# Patient Record
Sex: MALE | Race: WHITE | NOT HISPANIC OR LATINO | Employment: FULL TIME | URBAN - METROPOLITAN AREA
[De-identification: names, ages, dates, MRNs, and addresses within clinical notes are randomized per-mention and may not be internally consistent; named-entity substitution may affect disease eponyms.]

---

## 2018-02-15 ENCOUNTER — OFFICE VISIT (OUTPATIENT)
Dept: FAMILY MEDICINE CLINIC | Facility: CLINIC | Age: 43
End: 2018-02-15

## 2018-02-15 VITALS
RESPIRATION RATE: 16 BRPM | OXYGEN SATURATION: 98 % | SYSTOLIC BLOOD PRESSURE: 128 MMHG | WEIGHT: 167 LBS | HEART RATE: 64 BPM | HEIGHT: 70 IN | DIASTOLIC BLOOD PRESSURE: 72 MMHG | BODY MASS INDEX: 23.91 KG/M2 | TEMPERATURE: 98 F

## 2018-02-15 DIAGNOSIS — Z30.09 VASECTOMY EVALUATION: ICD-10-CM

## 2018-02-15 DIAGNOSIS — R51.9 PRESSURE IN HEAD: Primary | ICD-10-CM

## 2018-02-15 PROCEDURE — 99213 OFFICE O/P EST LOW 20 MIN: CPT | Performed by: FAMILY MEDICINE

## 2018-02-15 NOTE — PROGRESS NOTES
"  Assessment/Plan     Problem List Items Addressed This Visit     None      Visit Diagnoses     Pressure in head    -  Primary    Vasectomy evaluation        Relevant Orders    Ambulatory Referral to Urology           Follow up: Return if symptoms worsen or fail to improve.     DISCUSSION  Head pressure.  Unclear etiology.  Blood pressures controlled now.  Recommend that if this occurs again then he should get his blood pressure checked while tapping.  He agrees.  He has not had it for the last 3 or 4 days.  May be related to the nicotine in the nicotine e-cigarette.    Referral for vascectomy was also made.      MEDICATIONS PRESCRIBED  Requested Prescriptions      No prescriptions requested or ordered in this encounter            -------------------------------------------    Subjective     Chief Complaint   Patient presents with   • Hypertension     running high   • referral to urologist     discuss vasectomy       HPI    Head pressure  Off and on x 3-4 weeks  Trying to quit smoking and has been using the e cig and ? Nicotine too high  + rapid heart rate and chills, comes and goes  Head pressurte started after , in the evenings  Last episode was 3-4 days ago  Has stopped the vaporizer     Had some sinus sx and ear blockage and better now    Vasectomy  Wants to see urology  3 children        Past Medical History,Medications, Allergies, and social history was reviewed.    Review of Systems   Constitutional: Negative.    HENT: Negative.    Respiratory: Negative.    Cardiovascular: Negative.    Gastrointestinal: Negative.    Psychiatric/Behavioral: Negative.        Objective     Vitals:    02/15/18 1633 02/15/18 1655   BP: 118/76 128/72   Pulse: 64    Resp: 16    Temp: 98 °F (36.7 °C)    TempSrc: Temporal Artery     SpO2: 98%    Weight: 75.8 kg (167 lb)    Height: 177.8 cm (70\")         Physical Exam   Constitutional: He is oriented to person, place, and time. Vital signs are normal. He appears well-developed and " well-nourished.   HENT:   Head: Normocephalic and atraumatic.   Right Ear: Hearing, tympanic membrane, external ear and ear canal normal.   Left Ear: Hearing, tympanic membrane, external ear and ear canal normal.   Nose: Nose normal.   Mouth/Throat: Oropharynx is clear and moist.   Eyes: Conjunctivae, EOM and lids are normal. Pupils are equal, round, and reactive to light.   Neck: Normal range of motion. Neck supple. No thyromegaly present.   Cardiovascular: Normal rate, regular rhythm and normal heart sounds.  Exam reveals no friction rub.    No murmur heard.  Pulmonary/Chest: Effort normal and breath sounds normal. No respiratory distress. He has no wheezes. He has no rales.   Abdominal: Normal appearance.   Musculoskeletal: He exhibits no edema.   Neurological: He is alert and oriented to person, place, and time. He has normal strength.   Skin: Skin is warm and dry.   Psychiatric: He has a normal mood and affect. His speech is normal and behavior is normal. Cognition and memory are normal.   Nursing note and vitals reviewed.              Ronnie Miller MD

## 2018-04-05 ENCOUNTER — OFFICE VISIT (OUTPATIENT)
Dept: FAMILY MEDICINE CLINIC | Facility: CLINIC | Age: 43
End: 2018-04-05

## 2018-04-05 VITALS
HEART RATE: 124 BPM | HEIGHT: 70 IN | OXYGEN SATURATION: 98 % | DIASTOLIC BLOOD PRESSURE: 62 MMHG | RESPIRATION RATE: 18 BRPM | WEIGHT: 165.5 LBS | SYSTOLIC BLOOD PRESSURE: 122 MMHG | TEMPERATURE: 98.2 F | BODY MASS INDEX: 23.69 KG/M2

## 2018-04-05 DIAGNOSIS — H73.92 ABNORMAL TYMPANIC MEMBRANE OF LEFT EAR: ICD-10-CM

## 2018-04-05 DIAGNOSIS — R00.0 TACHYCARDIA: Primary | ICD-10-CM

## 2018-04-05 DIAGNOSIS — R55 NEAR SYNCOPE: ICD-10-CM

## 2018-04-05 PROCEDURE — 93000 ELECTROCARDIOGRAM COMPLETE: CPT | Performed by: FAMILY MEDICINE

## 2018-04-05 PROCEDURE — 99214 OFFICE O/P EST MOD 30 MIN: CPT | Performed by: FAMILY MEDICINE

## 2018-04-05 NOTE — PROGRESS NOTES
Assessment/Plan       Problems Addressed this Visit     None      Visit Diagnoses     Tachycardia    -  Primary    Relevant Orders    CBC & Differential    Comprehensive Metabolic Panel    TSH    T3    T4, Free    Holter monitor - 48 hour    Adult Transthoracic Echo Complete W/ Cont if Necessary Per Protocol    Near syncope        Relevant Orders    CBC & Differential    Comprehensive Metabolic Panel    Holter monitor - 48 hour    Adult Transthoracic Echo Complete W/ Cont if Necessary Per Protocol    Abnormal tympanic membrane of left ear        Relevant Orders    Ambulatory Referral to ENT (Otolaryngology)            Follow up: Return for follow up depends on review of labs and testing.     DISCUSSION  Tachycardia with near syncope.  Check labs and testing as noted.  Seek urgent medical attention if symptoms worsen or becomes associated with syncope.  He agrees.  At this time, he is hemodynamically stable and asymptomatic.  Recommend avoid anything that can increase heart rate including caffeine.    Also, due to abnormal tympanic membrane of the left ear, recommend refer to ENT for evaluation.  May have chronic perforation but need to evaluate for possible cholesteatoma    MEDICATIONS PRESCRIBED  Requested Prescriptions      No prescriptions requested or ordered in this encounter          -------------------------------------------    Subjective     Chief Complaint   Patient presents with   • Dizziness     feeling like he might pass out, heart pounding, pacing helps.          Dizziness   This is a recurrent (see below) problem. The current episode started more than 1 month ago. The problem occurs intermittently. The problem has been gradually worsening. Associated symptoms include headaches (pressure at times). Pertinent negatives include no chest pain. Nothing aggravates the symptoms. He has tried nothing (pacing helps) for the symptoms. The treatment provided no relief.       Increased heart at times  Feels  "like going to pass out  Quit coffee yesterday  Has been coming and going for several weeks  Feels ok right now  Worse this am   No chest pain   Gas feeling   Can feel heart racing , hard to tell if skips    Not passed out from it    Walking helps the symptoms    Denies drug use.      Past Medical History,Medications, Allergies, and social history was reviewed.      Review of Systems   Constitutional: Negative.    Respiratory: Negative.    Cardiovascular: Positive for palpitations (increased HR). Negative for chest pain.   Gastrointestinal: Negative.    Neurological: Positive for dizziness and headaches (pressure at times). Negative for syncope (near + ).   Psychiatric/Behavioral: Negative.        Objective     Vitals:    04/05/18 1239   BP: 122/62   Pulse: (!) 124   Resp: 18   Temp: 98.2 °F (36.8 °C)   SpO2: 98%   Weight: 75.1 kg (165 lb 8 oz)   Height: 177.8 cm (70\")          Physical Exam   Constitutional: He is oriented to person, place, and time. Vital signs are normal. He appears well-developed and well-nourished.   HENT:   Head: Normocephalic and atraumatic.   Right Ear: Hearing, tympanic membrane, external ear and ear canal normal.   Left Ear: Hearing, external ear and ear canal normal. Tympanic membrane is perforated (vs blister, not normal appearance).   Nose: Nose normal.   Mouth/Throat: Oropharynx is clear and moist.   Eyes: Conjunctivae, EOM and lids are normal. Pupils are equal, round, and reactive to light.   Neck: Normal range of motion. Neck supple. No thyromegaly present.   Cardiovascular: Regular rhythm and normal heart sounds.  Tachycardia present.  Exam reveals no friction rub.    No murmur heard.  Pulmonary/Chest: Effort normal and breath sounds normal. No respiratory distress. He has no wheezes. He has no rales.   Abdominal: Normal appearance.   Musculoskeletal: He exhibits no edema.   Neurological: He is alert and oriented to person, place, and time. He has normal strength.   Skin: Skin is " warm and dry.   Psychiatric: He has a normal mood and affect. His speech is normal and behavior is normal. Cognition and memory are normal.   Nursing note and vitals reviewed.              ECG 12 Lead  Date/Time: 4/5/2018 12:54 PM  Performed by: ELLEN MILLER  Authorized by: ELLEN MILLER   Previous ECG: no previous ECG available  Rhythm: sinus tachycardia  Rate: tachycardic  BPM: 102  Conduction: conduction normal  ST Segments: ST segments normal  T Waves: T waves normal  QRS axis: normal  Other findings: PRWP  Clinical impression: non-specific ECG             Ellen Miller MD

## 2018-04-06 LAB
ALBUMIN SERPL-MCNC: 4.9 G/DL (ref 3.2–4.8)
ALBUMIN/GLOB SERPL: 1.6 G/DL (ref 1.5–2.5)
ALP SERPL-CCNC: 76 U/L (ref 25–100)
ALT SERPL-CCNC: 29 U/L (ref 7–40)
AST SERPL-CCNC: 21 U/L (ref 0–33)
BASOPHILS # BLD AUTO: 0.04 10*3/MM3 (ref 0–0.2)
BASOPHILS NFR BLD AUTO: 0.6 % (ref 0–1)
BILIRUB SERPL-MCNC: 0.4 MG/DL (ref 0.3–1.2)
BUN SERPL-MCNC: 14 MG/DL (ref 9–23)
BUN/CREAT SERPL: 12.7 (ref 7–25)
CALCIUM SERPL-MCNC: 9.8 MG/DL (ref 8.7–10.4)
CHLORIDE SERPL-SCNC: 106 MMOL/L (ref 99–109)
CO2 SERPL-SCNC: 26 MMOL/L (ref 20–31)
CREAT SERPL-MCNC: 1.1 MG/DL (ref 0.6–1.3)
EOSINOPHIL # BLD AUTO: 0.09 10*3/MM3 (ref 0–0.3)
EOSINOPHIL NFR BLD AUTO: 1.4 % (ref 0–3)
ERYTHROCYTE [DISTWIDTH] IN BLOOD BY AUTOMATED COUNT: 13.7 % (ref 11.3–14.5)
GFR SERPLBLD CREATININE-BSD FMLA CKD-EPI: 74 ML/MIN/1.73
GFR SERPLBLD CREATININE-BSD FMLA CKD-EPI: 89 ML/MIN/1.73
GLOBULIN SER CALC-MCNC: 3.1 GM/DL
GLUCOSE SERPL-MCNC: 111 MG/DL (ref 70–100)
HCT VFR BLD AUTO: 44.2 % (ref 38.9–50.9)
HGB BLD-MCNC: 14.9 G/DL (ref 13.1–17.5)
IMM GRANULOCYTES # BLD: 0.01 10*3/MM3 (ref 0–0.03)
IMM GRANULOCYTES NFR BLD: 0.2 % (ref 0–0.6)
LYMPHOCYTES # BLD AUTO: 1.82 10*3/MM3 (ref 0.6–4.8)
LYMPHOCYTES NFR BLD AUTO: 27.4 % (ref 24–44)
MCH RBC QN AUTO: 31.8 PG (ref 27–31)
MCHC RBC AUTO-ENTMCNC: 33.7 G/DL (ref 32–36)
MCV RBC AUTO: 94.2 FL (ref 80–99)
MONOCYTES # BLD AUTO: 0.55 10*3/MM3 (ref 0–1)
MONOCYTES NFR BLD AUTO: 8.3 % (ref 0–12)
NEUTROPHILS # BLD AUTO: 4.14 10*3/MM3 (ref 1.5–8.3)
NEUTROPHILS NFR BLD AUTO: 62.1 % (ref 41–71)
PLATELET # BLD AUTO: 302 10*3/MM3 (ref 150–450)
POTASSIUM SERPL-SCNC: 4.1 MMOL/L (ref 3.5–5.5)
PROT SERPL-MCNC: 8 G/DL (ref 5.7–8.2)
RBC # BLD AUTO: 4.69 10*6/MM3 (ref 4.2–5.76)
SODIUM SERPL-SCNC: 142 MMOL/L (ref 132–146)
T3 SERPL-MCNC: 111 NG/DL (ref 71–180)
T4 FREE SERPL-MCNC: 1.21 NG/DL (ref 0.89–1.76)
TSH SERPL DL<=0.005 MIU/L-ACNC: 1.43 MIU/ML (ref 0.35–5.35)
WBC # BLD AUTO: 6.65 10*3/MM3 (ref 3.5–10.8)

## 2018-12-13 ENCOUNTER — TELEPHONE (OUTPATIENT)
Dept: FAMILY MEDICINE CLINIC | Facility: CLINIC | Age: 43
End: 2018-12-13

## 2018-12-13 NOTE — TELEPHONE ENCOUNTER
Please call.  Okay to get hepatitis A vaccine.  will place order once he comes in and gets that but please let him know that it does go fast .

## 2018-12-13 NOTE — TELEPHONE ENCOUNTER
----- Message from Luna Alcantara sent at 12/13/2018  1:24 PM EST -----  Contact: DR MCKEE   PATIENT IS REQUESTING AN ORDER BE PUT IN FOR A HEP A VACCINE

## 2018-12-18 DIAGNOSIS — Z23 NEED FOR HEPATITIS A VACCINATION: Primary | ICD-10-CM

## 2018-12-18 PROCEDURE — 90471 IMMUNIZATION ADMIN: CPT | Performed by: FAMILY MEDICINE

## 2018-12-18 PROCEDURE — 90632 HEPA VACCINE ADULT IM: CPT | Performed by: FAMILY MEDICINE

## 2019-01-11 ENCOUNTER — OFFICE VISIT (OUTPATIENT)
Dept: FAMILY MEDICINE CLINIC | Facility: CLINIC | Age: 44
End: 2019-01-11

## 2019-01-11 VITALS
HEIGHT: 70 IN | DIASTOLIC BLOOD PRESSURE: 82 MMHG | BODY MASS INDEX: 25.2 KG/M2 | TEMPERATURE: 98 F | WEIGHT: 176 LBS | HEART RATE: 76 BPM | SYSTOLIC BLOOD PRESSURE: 132 MMHG | RESPIRATION RATE: 18 BRPM

## 2019-01-11 DIAGNOSIS — K21.9 GASTROESOPHAGEAL REFLUX DISEASE WITHOUT ESOPHAGITIS: Primary | ICD-10-CM

## 2019-01-11 PROCEDURE — 99214 OFFICE O/P EST MOD 30 MIN: CPT | Performed by: FAMILY MEDICINE

## 2019-01-11 RX ORDER — OMEPRAZOLE 40 MG/1
40 CAPSULE, DELAYED RELEASE ORAL DAILY
Qty: 30 CAPSULE | Refills: 2 | Status: ON HOLD | OUTPATIENT
Start: 2019-01-11 | End: 2019-12-13

## 2019-01-11 NOTE — PROGRESS NOTES
Assessment/Plan       Problems Addressed this Visit     None      Visit Diagnoses     Gastroesophageal reflux disease without esophagitis    -  Primary    Relevant Medications    omeprazole (PRILOSEC) 40 MG capsule            Follow up: Return if symptoms worsen or fail to improve.     DISCUSSION  GI symptoms consistent with persistent gastroesophageal reflux disease.  Also having systemic symptoms including muscle aches and some disorientation.  Unclear if related but will try omeprazole 40 mg daily at least 30 minutes before meal.  Take for at least 8 weeks and then may try off unless it is not getting better he will call us within a week or so.  Call sooner if worsening.  He denies any blood in stool and no dark tarry stools.      MEDICATIONS PRESCRIBED  Requested Prescriptions     Signed Prescriptions Disp Refills   • omeprazole (PRILOSEC) 40 MG capsule 30 capsule 2     Sig: Take 1 capsule by mouth Daily.        -------------------------------------------    Subjective     Chief Complaint   Patient presents with   • stomach problems         Heartburn   He complains of belching and heartburn. He reports no abdominal pain, no chest pain or no nausea. muscle soreness, diarrhea, feels disoriented at times, burning sensation in chest. gassy as welll, burning in chest. This is a recurrent problem. The current episode started more than 1 month ago. The problem occurs frequently. The problem has been waxing and waning. The symptoms are aggravated by certain foods (at times, food makes it worse, onions and ketchup. This amhad 2 blueberry muffins, drinks 24 oz coffee and 32 oz sweet tea. no increase with alaying down). Associated symptoms include muscle weakness. Pertinent negatives include no weight loss. He has tried a histamine-2 antagonist (tried Ranitidine 150 mg once per day and sl help ) for the symptoms. The treatment provided mild relief.               Social History     Tobacco Use   Smoking Status Current  "Every Day Smoker   • Packs/day: 0.25   • Types: Cigarettes   Smokeless Tobacco Never Used        Past Medical History,Medications, Allergies, and social history was reviewed.      Review of Systems   Constitutional: Negative.  Negative for fever, unexpected weight gain and unexpected weight loss.        Occ cold sweats   HENT: Negative.    Respiratory: Negative.    Cardiovascular: Negative.  Negative for chest pain.   Gastrointestinal: Positive for diarrhea and indigestion. Negative for abdominal pain, nausea and vomiting.   Musculoskeletal: Positive for myalgias and muscle weakness.   Neurological:        Feels disoriented   Psychiatric/Behavioral: Negative.         Sx do not wake him up.       Objective     Vitals:    01/11/19 1612   BP: 132/82   Pulse: 76   Resp: 18   Temp: 98 °F (36.7 °C)   Weight: 79.8 kg (176 lb)   Height: 177.8 cm (70\")          Physical Exam   Constitutional: Vital signs are normal. He appears well-developed and well-nourished.   HENT:   Head: Normocephalic and atraumatic.   Right Ear: Hearing, tympanic membrane, external ear and ear canal normal.   Left Ear: Hearing, tympanic membrane, external ear and ear canal normal.   Mouth/Throat: Oropharynx is clear and moist.   Eyes: Conjunctivae, EOM and lids are normal. Pupils are equal, round, and reactive to light.   Neck: Normal range of motion. Neck supple. No thyromegaly present.   Cardiovascular: Normal rate, regular rhythm and normal heart sounds. Exam reveals no friction rub.   No murmur heard.  Pulmonary/Chest: Effort normal and breath sounds normal. No respiratory distress. He has no wheezes. He has no rales.   Abdominal: Soft. Normal appearance and bowel sounds are normal. He exhibits no distension and no mass. There is no tenderness. There is no rebound and no guarding.   Musculoskeletal: He exhibits no edema.   Neurological: He is alert. He has normal strength.   Skin: Skin is warm and dry.   Psychiatric: He has a normal mood and " affect. His speech is normal. Cognition and memory are normal.   Nursing note and vitals reviewed.                Ronnie Miller MD

## 2019-12-13 ENCOUNTER — HOSPITAL ENCOUNTER (OUTPATIENT)
Facility: HOSPITAL | Age: 44
Setting detail: HOSPITAL OUTPATIENT SURGERY
End: 2019-12-13
Admitting: INTERNAL MEDICINE

## 2019-12-13 ENCOUNTER — HOSPITAL ENCOUNTER (INPATIENT)
Facility: HOSPITAL | Age: 44
LOS: 4 days | Discharge: HOME | End: 2019-12-17
Attending: INTERNAL MEDICINE | Admitting: INTERNAL MEDICINE

## 2019-12-13 ENCOUNTER — APPOINTMENT (OUTPATIENT)
Dept: GENERAL RADIOLOGY | Facility: HOSPITAL | Age: 44
End: 2019-12-13

## 2019-12-13 ENCOUNTER — OFFICE VISIT (OUTPATIENT)
Dept: FAMILY MEDICINE CLINIC | Facility: CLINIC | Age: 44
End: 2019-12-13

## 2019-12-13 VITALS
HEART RATE: 107 BPM | OXYGEN SATURATION: 99 % | HEIGHT: 70 IN | RESPIRATION RATE: 18 BRPM | SYSTOLIC BLOOD PRESSURE: 110 MMHG | DIASTOLIC BLOOD PRESSURE: 66 MMHG | TEMPERATURE: 98.6 F | BODY MASS INDEX: 25.31 KG/M2 | WEIGHT: 176.8 LBS

## 2019-12-13 VITALS
SYSTOLIC BLOOD PRESSURE: 119 MMHG | RESPIRATION RATE: 18 BRPM | HEART RATE: 111 BPM | OXYGEN SATURATION: 95 % | DIASTOLIC BLOOD PRESSURE: 85 MMHG

## 2019-12-13 DIAGNOSIS — R50.9 FEVER, UNSPECIFIED FEVER CAUSE: ICD-10-CM

## 2019-12-13 DIAGNOSIS — R21 RASH: ICD-10-CM

## 2019-12-13 DIAGNOSIS — J02.0 ACUTE STREPTOCOCCAL PHARYNGITIS: Primary | ICD-10-CM

## 2019-12-13 DIAGNOSIS — R05.9 COUGH: ICD-10-CM

## 2019-12-13 PROBLEM — J18.9 COMMUNITY ACQUIRED PNEUMONIA OF RIGHT LOWER LOBE OF LUNG: Status: ACTIVE | Noted: 2019-12-13

## 2019-12-13 PROBLEM — J06.9 UPPER RESPIRATORY INFECTION: Status: ACTIVE | Noted: 2019-12-13

## 2019-12-13 PROBLEM — K04.7 DENTAL INFECTION: Status: ACTIVE | Noted: 2019-12-13

## 2019-12-13 PROBLEM — Z72.0 TOBACCO ABUSE: Status: ACTIVE | Noted: 2019-12-13

## 2019-12-13 PROBLEM — I31.9 PERICARDITIS: Status: ACTIVE | Noted: 2019-12-13

## 2019-12-13 LAB
EXPIRATION DATE: NORMAL
EXPIRATION DATE: NORMAL
FLUAV AG NPH QL: NEGATIVE
FLUBV AG NPH QL: NEGATIVE
HETEROPH AB SER QL LA: NEGATIVE
INTERNAL CONTROL: NORMAL
INTERNAL CONTROL: NORMAL
Lab: NORMAL
Lab: NORMAL

## 2019-12-13 PROCEDURE — B2151ZZ FLUOROSCOPY OF LEFT HEART USING LOW OSMOLAR CONTRAST: ICD-10-PCS | Performed by: INTERNAL MEDICINE

## 2019-12-13 PROCEDURE — 86308 HETEROPHILE ANTIBODY SCREEN: CPT | Performed by: FAMILY MEDICINE

## 2019-12-13 PROCEDURE — 99233 SBSQ HOSP IP/OBS HIGH 50: CPT | Performed by: INTERNAL MEDICINE

## 2019-12-13 PROCEDURE — 99152 MOD SED SAME PHYS/QHP 5/>YRS: CPT | Performed by: INTERNAL MEDICINE

## 2019-12-13 PROCEDURE — C1894 INTRO/SHEATH, NON-LASER: HCPCS | Performed by: INTERNAL MEDICINE

## 2019-12-13 PROCEDURE — 93458 L HRT ARTERY/VENTRICLE ANGIO: CPT | Performed by: INTERNAL MEDICINE

## 2019-12-13 PROCEDURE — 99213 OFFICE O/P EST LOW 20 MIN: CPT | Performed by: FAMILY MEDICINE

## 2019-12-13 PROCEDURE — 87804 INFLUENZA ASSAY W/OPTIC: CPT | Performed by: FAMILY MEDICINE

## 2019-12-13 PROCEDURE — 4A023N7 MEASUREMENT OF CARDIAC SAMPLING AND PRESSURE, LEFT HEART, PERCUTANEOUS APPROACH: ICD-10-PCS | Performed by: INTERNAL MEDICINE

## 2019-12-13 PROCEDURE — C1769 GUIDE WIRE: HCPCS | Performed by: INTERNAL MEDICINE

## 2019-12-13 PROCEDURE — 0 IOPAMIDOL PER 1 ML: Performed by: INTERNAL MEDICINE

## 2019-12-13 PROCEDURE — 84484 ASSAY OF TROPONIN QUANT: CPT | Performed by: INTERNAL MEDICINE

## 2019-12-13 PROCEDURE — 93005 ELECTROCARDIOGRAM TRACING: CPT | Performed by: INTERNAL MEDICINE

## 2019-12-13 PROCEDURE — B2111ZZ FLUOROSCOPY OF MULTIPLE CORONARY ARTERIES USING LOW OSMOLAR CONTRAST: ICD-10-PCS | Performed by: INTERNAL MEDICINE

## 2019-12-13 PROCEDURE — 25010000002 MIDAZOLAM PER 1 MG: Performed by: INTERNAL MEDICINE

## 2019-12-13 PROCEDURE — C1760 CLOSURE DEV, VASC: HCPCS | Performed by: INTERNAL MEDICINE

## 2019-12-13 PROCEDURE — 25010000002 FENTANYL CITRATE (PF) 100 MCG/2ML SOLUTION: Performed by: INTERNAL MEDICINE

## 2019-12-13 PROCEDURE — 71045 X-RAY EXAM CHEST 1 VIEW: CPT

## 2019-12-13 RX ORDER — IBUPROFEN 600 MG/1
600 TABLET ORAL
Status: DISCONTINUED | OUTPATIENT
Start: 2019-12-14 | End: 2019-12-17 | Stop reason: HOSPADM

## 2019-12-13 RX ORDER — NALOXONE HCL 0.4 MG/ML
0.4 VIAL (ML) INJECTION
Status: CANCELLED | OUTPATIENT
Start: 2019-12-13

## 2019-12-13 RX ORDER — MIDAZOLAM HYDROCHLORIDE 1 MG/ML
INJECTION INTRAMUSCULAR; INTRAVENOUS AS NEEDED
Status: DISCONTINUED | OUTPATIENT
Start: 2019-12-13 | End: 2019-12-13 | Stop reason: HOSPADM

## 2019-12-13 RX ORDER — PANTOPRAZOLE SODIUM 40 MG/1
40 TABLET, DELAYED RELEASE ORAL
Status: DISCONTINUED | OUTPATIENT
Start: 2019-12-14 | End: 2019-12-17 | Stop reason: HOSPADM

## 2019-12-13 RX ORDER — MORPHINE SULFATE 2 MG/ML
1 INJECTION, SOLUTION INTRAMUSCULAR; INTRAVENOUS EVERY 4 HOURS PRN
Status: CANCELLED | OUTPATIENT
Start: 2019-12-13 | End: 2019-12-23

## 2019-12-13 RX ORDER — PREDNISONE 20 MG/1
20 TABLET ORAL 2 TIMES DAILY
Qty: 10 TABLET | Refills: 0 | Status: ON HOLD | OUTPATIENT
Start: 2019-12-13 | End: 2019-12-13

## 2019-12-13 RX ORDER — HYDROCODONE BITARTRATE AND ACETAMINOPHEN 5; 325 MG/1; MG/1
1 TABLET ORAL EVERY 4 HOURS PRN
Status: CANCELLED | OUTPATIENT
Start: 2019-12-13 | End: 2019-12-23

## 2019-12-13 RX ORDER — AMOXICILLIN 500 MG/1
CAPSULE ORAL
Refills: 0 | COMMUNITY
Start: 2019-12-03 | End: 2019-12-13

## 2019-12-13 RX ORDER — LIDOCAINE HYDROCHLORIDE 10 MG/ML
INJECTION, SOLUTION EPIDURAL; INFILTRATION; INTRACAUDAL; PERINEURAL AS NEEDED
Status: DISCONTINUED | OUTPATIENT
Start: 2019-12-13 | End: 2019-12-13 | Stop reason: HOSPADM

## 2019-12-13 RX ORDER — GUAIFENESIN/DEXTROMETHORPHAN 100-10MG/5
5 SYRUP ORAL EVERY 4 HOURS PRN
Status: DISCONTINUED | OUTPATIENT
Start: 2019-12-13 | End: 2019-12-14

## 2019-12-13 RX ORDER — BROMPHENIRAMINE MALEATE, PSEUDOEPHEDRINE HYDROCHLORIDE, AND DEXTROMETHORPHAN HYDROBROMIDE 2; 30; 10 MG/5ML; MG/5ML; MG/5ML
SYRUP ORAL
COMMUNITY
Start: 2019-12-11 | End: 2019-12-13 | Stop reason: ALTCHOICE

## 2019-12-13 RX ORDER — PANTOPRAZOLE SODIUM 40 MG/1
40 TABLET, DELAYED RELEASE ORAL EVERY MORNING
Status: CANCELLED | OUTPATIENT
Start: 2019-12-14

## 2019-12-13 RX ORDER — SODIUM CHLORIDE 9 MG/ML
INJECTION, SOLUTION INTRAVENOUS CONTINUOUS PRN
Status: COMPLETED | OUTPATIENT
Start: 2019-12-13 | End: 2019-12-13

## 2019-12-13 RX ORDER — CEPHALEXIN 250 MG/1
500 CAPSULE ORAL EVERY 12 HOURS SCHEDULED
Status: DISCONTINUED | OUTPATIENT
Start: 2019-12-13 | End: 2019-12-13

## 2019-12-13 RX ORDER — ACETAMINOPHEN 325 MG/1
650 TABLET ORAL EVERY 4 HOURS PRN
Status: CANCELLED | OUTPATIENT
Start: 2019-12-13

## 2019-12-13 RX ORDER — FENTANYL CITRATE 50 UG/ML
INJECTION, SOLUTION INTRAMUSCULAR; INTRAVENOUS AS NEEDED
Status: DISCONTINUED | OUTPATIENT
Start: 2019-12-13 | End: 2019-12-13 | Stop reason: HOSPADM

## 2019-12-13 RX ORDER — TEMAZEPAM 7.5 MG/1
7.5 CAPSULE ORAL NIGHTLY PRN
Status: CANCELLED | OUTPATIENT
Start: 2019-12-13 | End: 2019-12-23

## 2019-12-13 RX ORDER — CEPHALEXIN 500 MG/1
500 CAPSULE ORAL 2 TIMES DAILY
Qty: 20 CAPSULE | Refills: 0 | Status: ON HOLD | OUTPATIENT
Start: 2019-12-13 | End: 2019-12-13

## 2019-12-13 RX ORDER — ALPRAZOLAM 0.25 MG/1
0.25 TABLET ORAL 3 TIMES DAILY PRN
Status: CANCELLED | OUTPATIENT
Start: 2019-12-13 | End: 2019-12-23

## 2019-12-13 RX ORDER — DEXTROMETHORPHAN HYDROBROMIDE AND PROMETHAZINE HYDROCHLORIDE 15; 6.25 MG/5ML; MG/5ML
5 SYRUP ORAL 4 TIMES DAILY PRN
Qty: 180 ML | Refills: 0 | Status: ON HOLD | OUTPATIENT
Start: 2019-12-13 | End: 2019-12-13

## 2019-12-13 NOTE — PATIENT INSTRUCTIONS
Go to the nearest ER or return to clinic if symptoms worsen, fever/chill develop    Strep Throat    Strep throat is an infection of the throat. It is caused by germs. Strep throat spreads from person to person because of coughing, sneezing, or close contact.  Follow these instructions at home:  Medicines  · Take over-the-counter and prescription medicines only as told by your doctor.  · Take your antibiotic medicine as told by your doctor. Do not stop taking the medicine even if you feel better.  · Have family members who also have a sore throat or fever go to a doctor.  Eating and drinking  · Do not share food, drinking cups, or personal items.  · Try eating soft foods until your sore throat feels better.  · Drink enough fluid to keep your pee (urine) clear or pale yellow.  General instructions  · Rinse your mouth (gargle) with a salt-water mixture 3-4 times per day or as needed. To make a salt-water mixture, stir ½-1 tsp of salt into 1 cup of warm water.  · Make sure that all people in your house wash their hands well.  · Rest.  · Stay home from school or work until you have been taking antibiotics for 24 hours.  · Keep all follow-up visits as told by your doctor. This is important.  Contact a doctor if:  · Your neck keeps getting bigger.  · You get a rash, cough, or earache.  · You cough up thick liquid that is green, yellow-brown, or bloody.  · You have pain that does not get better with medicine.  · Your problems get worse instead of getting better.  · You have a fever.  Get help right away if:  · You throw up (vomit).  · You get a very bad headache.  · You neck hurts or it feels stiff.  · You have chest pain or you are short of breath.  · You have drooling, very bad throat pain, or changes in your voice.  · Your neck is swollen or the skin gets red and tender.  · Your mouth is dry or you are peeing less than normal.  · You keep feeling more tired or it is hard to wake up.  · Your joints are red or they  hurt.  This information is not intended to replace advice given to you by your health care provider. Make sure you discuss any questions you have with your health care provider.  Document Released: 06/05/2009 Document Revised: 08/16/2017 Document Reviewed: 04/11/2016  [employer] Patient Education © 2019 [employer]

## 2019-12-13 NOTE — PROGRESS NOTES
Dyan Schneider is a 44 y.o. male.   Chief Complaint   Patient presents with   • Fever     since 12/8/19 reports highest was 103.7    • Generalized Body Aches     [facility] 12/11-neg flu/neg strep   • Cough     wet, productive cough clear to yellow tinted sputum   • Rash     started 12/11 all over entire body     History of Present Illness   Symptoms started 12/8/19 with fever, which is now intermittent.   Tmax 103.7   Generalized body aches and joint aches. Even hard for him to stand up and get out of bed,  strength is decreased.   Went to [facility] 12/11/19, tested negative for influenza and strep. [facility] treated him with cough syrup, tylenol, ibuprofen.  Rash started 2 nights ago on his back. Next day, on abdomen, and then today all the way to his feet. Rash gradually improving, but still present.   Prior to illness, he was taking Amoxicillin for dental reasons. He stopped this 2 days ago.   Currently, has sore throat, productive cough, body aches, and fever.     The following portions of the patient's history were reviewed and updated as appropriate: allergies, current medications, past family history, past medical history, past social history, past surgical history and problem list.    Review of Systems   Constitutional: Positive for fatigue and fever.   HENT: Positive for congestion, ear pain and sore throat. Negative for ear discharge, rhinorrhea and sinus pressure.    Respiratory: Positive for cough. Negative for shortness of breath and wheezing.    Cardiovascular: Negative for chest pain.   Musculoskeletal: Positive for myalgias.   Skin: Positive for rash.   Neurological: Positive for headache.       Objective   Physical Exam   Constitutional: He appears well-developed and well-nourished.   Appears ill   HENT:   Head: Normocephalic.   Right Ear: Hearing, tympanic membrane, external ear and ear canal normal. Tympanic membrane is not injected and not erythematous.   Left Ear: External ear  normal. Tympanic membrane is not injected and not erythematous. A middle ear effusion is present.   Nose: Congestion present.   Mouth/Throat: Uvula is midline. Posterior oropharyngeal erythema present. Tonsils are 2+ on the right. Tonsils are 2+ on the left. Tonsillar exudate.   Eyes: Conjunctivae are normal.   Cardiovascular: Normal rate, regular rhythm and normal heart sounds.   Pulmonary/Chest: Effort normal and breath sounds normal. He has no wheezes. He has no rhonchi.   Lymphadenopathy:     He has no cervical adenopathy.   Neurological: He is alert.   Skin: Skin is warm and dry. Rash noted. Rash is macular (blanchable, mild erythema, present on back, trunk, arms, and legs.).   Psychiatric: His behavior is normal.   Nursing note and vitals reviewed.        Assessment/Plan   Boom was seen today for fever, generalized body aches, cough and rash.    Diagnoses and all orders for this visit:    Acute streptococcal pharyngitis  -     predniSONE (DELTASONE) 20 MG tablet; Take 1 tablet by mouth 2 (Two) Times a Day for 5 days.  -     cephalexin (KEFLEX) 500 MG capsule; Take 1 capsule by mouth 2 (Two) Times a Day for 10 days.    Rash    Fever, unspecified fever cause  -     POCT Influenza A/B  -     POCT Infectious mononucleosis antibody  -     predniSONE (DELTASONE) 20 MG tablet; Take 1 tablet by mouth 2 (Two) Times a Day for 5 days.  -     cephalexin (KEFLEX) 500 MG capsule; Take 1 capsule by mouth 2 (Two) Times a Day for 10 days.    Cough  -     promethazine-dextromethorphan (PROMETHAZINE-DM) 6.25-15 MG/5ML syrup; Take 5 mL by mouth 4 (Four) Times a Day As Needed for Cough.      Monospot and influenza A/B are negative.  Based on physical exam, will cover for streptococcal pharyngitis.  If symptoms worsen, he has been advised to go to ER for immediate evaluation.

## 2019-12-14 ENCOUNTER — APPOINTMENT (OUTPATIENT)
Dept: CARDIOLOGY | Facility: HOSPITAL | Age: 44
End: 2019-12-14

## 2019-12-14 PROBLEM — R09.1 PLEURISY: Status: ACTIVE | Noted: 2019-12-14

## 2019-12-14 LAB
ANION GAP SERPL CALCULATED.3IONS-SCNC: 16 MMOL/L (ref 5–15)
B PARAPERT DNA SPEC QL NAA+PROBE: NOT DETECTED
B PERT DNA SPEC QL NAA+PROBE: NOT DETECTED
BASOPHILS # BLD AUTO: 0.04 10*3/MM3 (ref 0–0.2)
BASOPHILS NFR BLD AUTO: 0.3 % (ref 0–1.5)
BH CV ECHO MEAS - AO ROOT AREA (BSA CORRECTED): 1.4
BH CV ECHO MEAS - AO ROOT AREA: 6.1 CM^2
BH CV ECHO MEAS - AO ROOT DIAM: 2.8 CM
BH CV ECHO MEAS - BSA(HAYCOCK): 2 M^2
BH CV ECHO MEAS - BSA: 2 M^2
BH CV ECHO MEAS - BZI_BMI: 25.3 KILOGRAMS/M^2
BH CV ECHO MEAS - BZI_METRIC_HEIGHT: 177.8 CM
BH CV ECHO MEAS - BZI_METRIC_WEIGHT: 79.8 KG
BH CV ECHO MEAS - EDV(CUBED): 85.6 ML
BH CV ECHO MEAS - EDV(TEICH): 88.1 ML
BH CV ECHO MEAS - EF(CUBED): 63.6 %
BH CV ECHO MEAS - EF(TEICH): 55.3 %
BH CV ECHO MEAS - ESV(CUBED): 31.2 ML
BH CV ECHO MEAS - ESV(TEICH): 39.4 ML
BH CV ECHO MEAS - FS: 28.6 %
BH CV ECHO MEAS - IVS/LVPW: 0.97
BH CV ECHO MEAS - IVSD: 0.86 CM
BH CV ECHO MEAS - LA DIMENSION: 2.8 CM
BH CV ECHO MEAS - LA/AO: 1
BH CV ECHO MEAS - LAD MAJOR: 4.7 CM
BH CV ECHO MEAS - LAT PEAK E' VEL: 14.1 CM/SEC
BH CV ECHO MEAS - LATERAL E/E' RATIO: 6
BH CV ECHO MEAS - LV MASS(C)D: 123.4 GRAMS
BH CV ECHO MEAS - LV MASS(C)DI: 62.4 GRAMS/M^2
BH CV ECHO MEAS - LVIDD: 4.4 CM
BH CV ECHO MEAS - LVIDS: 3.1 CM
BH CV ECHO MEAS - LVPWD: 0.89 CM
BH CV ECHO MEAS - MED PEAK E' VEL: 10.2 CM/SEC
BH CV ECHO MEAS - MEDIAL E/E' RATIO: 8.3
BH CV ECHO MEAS - MV A MAX VEL: 75.5 CM/SEC
BH CV ECHO MEAS - MV DEC SLOPE: 541.9 CM/SEC^2
BH CV ECHO MEAS - MV DEC TIME: 0.16 SEC
BH CV ECHO MEAS - MV E MAX VEL: 85.9 CM/SEC
BH CV ECHO MEAS - MV E/A: 1.1
BH CV ECHO MEAS - MV P1/2T MAX VEL: 105.9 CM/SEC
BH CV ECHO MEAS - MV P1/2T: 57.2 MSEC
BH CV ECHO MEAS - MVA P1/2T LCG: 2.1 CM^2
BH CV ECHO MEAS - MVA(P1/2T): 3.8 CM^2
BH CV ECHO MEAS - PA ACC SLOPE: 875.5 CM/SEC^2
BH CV ECHO MEAS - PA ACC TIME: 0.1 SEC
BH CV ECHO MEAS - PA PR(ACCEL): 33.8 MMHG
BH CV ECHO MEAS - RV MAX PG: 1.1 MMHG
BH CV ECHO MEAS - RV V1 MAX: 51.8 CM/SEC
BH CV ECHO MEAS - SI(CUBED): 27.5 ML/M^2
BH CV ECHO MEAS - SI(TEICH): 24.6 ML/M^2
BH CV ECHO MEAS - SV(CUBED): 54.4 ML
BH CV ECHO MEAS - SV(TEICH): 48.7 ML
BH CV ECHO MEAS - TAPSE (>1.6): 2.1 CM2
BH CV ECHO MEASUREMENTS AVERAGE E/E' RATIO: 7.07
BH CV XLRA - RV BASE: 3.8 CM
BH CV XLRA - RV LENGTH: 6.6 CM
BH CV XLRA - RV MID: 3.2 CM
BH CV XLRA - TDI S': 11.5 CM/SEC
BUN BLD-MCNC: 14 MG/DL (ref 6–20)
BUN/CREAT SERPL: 18.4 (ref 7–25)
C PNEUM DNA NPH QL NAA+NON-PROBE: NOT DETECTED
CALCIUM SPEC-SCNC: 9.1 MG/DL (ref 8.6–10.5)
CHLORIDE SERPL-SCNC: 100 MMOL/L (ref 98–107)
CHOLEST SERPL-MCNC: 99 MG/DL (ref 0–200)
CO2 SERPL-SCNC: 20 MMOL/L (ref 22–29)
CREAT BLD-MCNC: 0.76 MG/DL (ref 0.76–1.27)
DEPRECATED RDW RBC AUTO: 48.7 FL (ref 37–54)
EOSINOPHIL # BLD AUTO: 0.09 10*3/MM3 (ref 0–0.4)
EOSINOPHIL NFR BLD AUTO: 0.6 % (ref 0.3–6.2)
ERYTHROCYTE [DISTWIDTH] IN BLOOD BY AUTOMATED COUNT: 14.2 % (ref 12.3–15.4)
FLUAV H1 2009 PAND RNA NPH QL NAA+PROBE: NOT DETECTED
FLUAV H1 HA GENE NPH QL NAA+PROBE: NOT DETECTED
FLUAV H3 RNA NPH QL NAA+PROBE: NOT DETECTED
FLUAV SUBTYP SPEC NAA+PROBE: NOT DETECTED
FLUBV RNA ISLT QL NAA+PROBE: NOT DETECTED
GFR SERPL CREATININE-BSD FRML MDRD: 112 ML/MIN/1.73
GLUCOSE BLD-MCNC: 149 MG/DL (ref 65–99)
HADV DNA SPEC NAA+PROBE: DETECTED
HBA1C MFR BLD: 5.7 % (ref 4.8–5.6)
HCOV 229E RNA SPEC QL NAA+PROBE: NOT DETECTED
HCOV HKU1 RNA SPEC QL NAA+PROBE: NOT DETECTED
HCOV NL63 RNA SPEC QL NAA+PROBE: NOT DETECTED
HCOV OC43 RNA SPEC QL NAA+PROBE: NOT DETECTED
HCT VFR BLD AUTO: 35 % (ref 37.5–51)
HDLC SERPL-MCNC: 12 MG/DL (ref 40–60)
HGB BLD-MCNC: 11.7 G/DL (ref 13–17.7)
HMPV RNA NPH QL NAA+NON-PROBE: NOT DETECTED
HPIV1 RNA SPEC QL NAA+PROBE: NOT DETECTED
HPIV2 RNA SPEC QL NAA+PROBE: NOT DETECTED
HPIV3 RNA NPH QL NAA+PROBE: NOT DETECTED
HPIV4 P GENE NPH QL NAA+PROBE: NOT DETECTED
IMM GRANULOCYTES # BLD AUTO: 0.17 10*3/MM3 (ref 0–0.05)
IMM GRANULOCYTES NFR BLD AUTO: 1.1 % (ref 0–0.5)
LDLC SERPL CALC-MCNC: 33 MG/DL (ref 0–100)
LDLC/HDLC SERPL: 2.75 {RATIO}
LEFT ATRIUM VOLUME INDEX: 9.1 ML/M^2
LEFT ATRIUM VOLUME: 18 ML
LYMPHOCYTES # BLD AUTO: 1.18 10*3/MM3 (ref 0.7–3.1)
LYMPHOCYTES NFR BLD AUTO: 7.9 % (ref 19.6–45.3)
M PNEUMO IGG SER IA-ACNC: NOT DETECTED
MAGNESIUM SERPL-MCNC: 1.9 MG/DL (ref 1.6–2.6)
MCH RBC QN AUTO: 30.7 PG (ref 26.6–33)
MCHC RBC AUTO-ENTMCNC: 33.4 G/DL (ref 31.5–35.7)
MCV RBC AUTO: 91.9 FL (ref 79–97)
MONOCYTES # BLD AUTO: 0.63 10*3/MM3 (ref 0.1–0.9)
MONOCYTES NFR BLD AUTO: 4.2 % (ref 5–12)
NEUTROPHILS # BLD AUTO: 12.87 10*3/MM3 (ref 1.7–7)
NEUTROPHILS NFR BLD AUTO: 85.9 % (ref 42.7–76)
NRBC BLD AUTO-RTO: 0 /100 WBC (ref 0–0.2)
PHOSPHATE SERPL-MCNC: 2.9 MG/DL (ref 2.5–4.5)
PLAT MORPH BLD: NORMAL
PLATELET # BLD AUTO: 286 10*3/MM3 (ref 140–450)
PMV BLD AUTO: 10.7 FL (ref 6–12)
POTASSIUM BLD-SCNC: 3.4 MMOL/L (ref 3.5–5.2)
POTASSIUM BLD-SCNC: 3.9 MMOL/L (ref 3.5–5.2)
RBC # BLD AUTO: 3.81 10*6/MM3 (ref 4.14–5.8)
RBC MORPH BLD: NORMAL
RHINOVIRUS RNA SPEC NAA+PROBE: NOT DETECTED
RSV RNA NPH QL NAA+NON-PROBE: NOT DETECTED
S PNEUM AG SPEC QL LA: NEGATIVE
SODIUM BLD-SCNC: 136 MMOL/L (ref 136–145)
TRIGL SERPL-MCNC: 270 MG/DL (ref 0–150)
TROPONIN T SERPL-MCNC: 0.75 NG/ML (ref 0–0.03)
TROPONIN T SERPL-MCNC: 1.06 NG/ML (ref 0–0.03)
VLDLC SERPL-MCNC: 54 MG/DL
WBC MORPH BLD: NORMAL
WBC NRBC COR # BLD: 14.98 10*3/MM3 (ref 3.4–10.8)

## 2019-12-14 PROCEDURE — 87899 AGENT NOS ASSAY W/OPTIC: CPT | Performed by: NURSE PRACTITIONER

## 2019-12-14 PROCEDURE — 85007 BL SMEAR W/DIFF WBC COUNT: CPT | Performed by: NURSE PRACTITIONER

## 2019-12-14 PROCEDURE — 84132 ASSAY OF SERUM POTASSIUM: CPT | Performed by: INTERNAL MEDICINE

## 2019-12-14 PROCEDURE — 87070 CULTURE OTHR SPECIMN AEROBIC: CPT | Performed by: INTERNAL MEDICINE

## 2019-12-14 PROCEDURE — 87040 BLOOD CULTURE FOR BACTERIA: CPT | Performed by: NURSE PRACTITIONER

## 2019-12-14 PROCEDURE — 25010000002 AZITHROMYCIN PER 500 MG: Performed by: INTERNAL MEDICINE

## 2019-12-14 PROCEDURE — 84100 ASSAY OF PHOSPHORUS: CPT | Performed by: NURSE PRACTITIONER

## 2019-12-14 PROCEDURE — 99232 SBSQ HOSP IP/OBS MODERATE 35: CPT | Performed by: INTERNAL MEDICINE

## 2019-12-14 PROCEDURE — 84484 ASSAY OF TROPONIN QUANT: CPT | Performed by: NURSE PRACTITIONER

## 2019-12-14 PROCEDURE — 83735 ASSAY OF MAGNESIUM: CPT | Performed by: NURSE PRACTITIONER

## 2019-12-14 PROCEDURE — 87205 SMEAR GRAM STAIN: CPT | Performed by: INTERNAL MEDICINE

## 2019-12-14 PROCEDURE — 93306 TTE W/DOPPLER COMPLETE: CPT

## 2019-12-14 PROCEDURE — 0100U HC BIOFIRE FILMARRAY RESP PANEL 2: CPT | Performed by: INTERNAL MEDICINE

## 2019-12-14 PROCEDURE — 80048 BASIC METABOLIC PNL TOTAL CA: CPT | Performed by: NURSE PRACTITIONER

## 2019-12-14 PROCEDURE — 85025 COMPLETE CBC W/AUTO DIFF WBC: CPT | Performed by: NURSE PRACTITIONER

## 2019-12-14 PROCEDURE — 80061 LIPID PANEL: CPT | Performed by: NURSE PRACTITIONER

## 2019-12-14 PROCEDURE — 94799 UNLISTED PULMONARY SVC/PX: CPT

## 2019-12-14 PROCEDURE — 25010000002 CEFTRIAXONE PER 250 MG: Performed by: INTERNAL MEDICINE

## 2019-12-14 PROCEDURE — 25010000002 MORPHINE PER 10 MG: Performed by: NURSE PRACTITIONER

## 2019-12-14 PROCEDURE — 83036 HEMOGLOBIN GLYCOSYLATED A1C: CPT | Performed by: NURSE PRACTITIONER

## 2019-12-14 RX ORDER — MORPHINE SULFATE 2 MG/ML
2 INJECTION, SOLUTION INTRAMUSCULAR; INTRAVENOUS ONCE
Status: COMPLETED | OUTPATIENT
Start: 2019-12-14 | End: 2019-12-14

## 2019-12-14 RX ORDER — CODEINE PHOSPHATE AND GUAIFENESIN 10; 100 MG/5ML; MG/5ML
5 SOLUTION ORAL EVERY 4 HOURS PRN
Status: DISCONTINUED | OUTPATIENT
Start: 2019-12-14 | End: 2019-12-17 | Stop reason: HOSPADM

## 2019-12-14 RX ORDER — MAGNESIUM SULFATE HEPTAHYDRATE 40 MG/ML
2 INJECTION, SOLUTION INTRAVENOUS AS NEEDED
Status: DISCONTINUED | OUTPATIENT
Start: 2019-12-14 | End: 2019-12-17 | Stop reason: HOSPADM

## 2019-12-14 RX ORDER — CHOLECALCIFEROL (VITAMIN D3) 125 MCG
5 CAPSULE ORAL NIGHTLY PRN
Status: DISCONTINUED | OUTPATIENT
Start: 2019-12-14 | End: 2019-12-17 | Stop reason: HOSPADM

## 2019-12-14 RX ORDER — ACETAMINOPHEN 325 MG/1
650 TABLET ORAL EVERY 6 HOURS PRN
Status: DISCONTINUED | OUTPATIENT
Start: 2019-12-14 | End: 2019-12-17 | Stop reason: HOSPADM

## 2019-12-14 RX ORDER — MAGNESIUM SULFATE HEPTAHYDRATE 40 MG/ML
4 INJECTION, SOLUTION INTRAVENOUS AS NEEDED
Status: DISCONTINUED | OUTPATIENT
Start: 2019-12-14 | End: 2019-12-17 | Stop reason: HOSPADM

## 2019-12-14 RX ORDER — POTASSIUM CHLORIDE 750 MG/1
40 CAPSULE, EXTENDED RELEASE ORAL AS NEEDED
Status: DISCONTINUED | OUTPATIENT
Start: 2019-12-14 | End: 2019-12-17 | Stop reason: HOSPADM

## 2019-12-14 RX ORDER — POTASSIUM CHLORIDE 1.5 G/1.77G
40 POWDER, FOR SOLUTION ORAL AS NEEDED
Status: DISCONTINUED | OUTPATIENT
Start: 2019-12-14 | End: 2019-12-17 | Stop reason: HOSPADM

## 2019-12-14 RX ORDER — MORPHINE SULFATE 2 MG/ML
2 INJECTION, SOLUTION INTRAMUSCULAR; INTRAVENOUS EVERY 4 HOURS PRN
Status: DISPENSED | OUTPATIENT
Start: 2019-12-14 | End: 2019-12-15

## 2019-12-14 RX ADMIN — GUAIFENESIN AND DEXTROMETHORPHAN 5 ML: 100; 10 SYRUP ORAL at 00:03

## 2019-12-14 RX ADMIN — MAGNESIUM SULFATE HEPTAHYDRATE 4 G: 40 INJECTION, SOLUTION INTRAVENOUS at 10:06

## 2019-12-14 RX ADMIN — CEFTRIAXONE 1 G: 1 INJECTION, POWDER, FOR SOLUTION INTRAMUSCULAR; INTRAVENOUS at 00:03

## 2019-12-14 RX ADMIN — IBUPROFEN 600 MG: 600 TABLET, FILM COATED ORAL at 17:41

## 2019-12-14 RX ADMIN — POTASSIUM CHLORIDE 40 MEQ: 750 CAPSULE, EXTENDED RELEASE ORAL at 10:06

## 2019-12-14 RX ADMIN — MORPHINE SULFATE 2 MG: 2 INJECTION, SOLUTION INTRAMUSCULAR; INTRAVENOUS at 05:03

## 2019-12-14 RX ADMIN — IBUPROFEN 600 MG: 600 TABLET, FILM COATED ORAL at 12:00

## 2019-12-14 RX ADMIN — IBUPROFEN 600 MG: 600 TABLET, FILM COATED ORAL at 08:11

## 2019-12-14 RX ADMIN — MORPHINE SULFATE 2 MG: 2 INJECTION, SOLUTION INTRAMUSCULAR; INTRAVENOUS at 06:12

## 2019-12-14 RX ADMIN — GUAIFENESIN AND CODEINE PHOSPHATE 5 ML: 10; 100 LIQUID ORAL at 21:54

## 2019-12-14 RX ADMIN — PANTOPRAZOLE SODIUM 40 MG: 40 TABLET, DELAYED RELEASE ORAL at 06:12

## 2019-12-14 RX ADMIN — ACETAMINOPHEN 650 MG: 325 TABLET ORAL at 04:32

## 2019-12-14 RX ADMIN — AZITHROMYCIN MONOHYDRATE 500 MG: 500 INJECTION, POWDER, LYOPHILIZED, FOR SOLUTION INTRAVENOUS at 00:03

## 2019-12-14 RX ADMIN — POTASSIUM CHLORIDE 40 MEQ: 750 CAPSULE, EXTENDED RELEASE ORAL at 14:15

## 2019-12-14 RX ADMIN — CEFTRIAXONE 1 G: 1 INJECTION, POWDER, FOR SOLUTION INTRAMUSCULAR; INTRAVENOUS at 20:42

## 2019-12-14 RX ADMIN — MORPHINE SULFATE 2 MG: 2 INJECTION, SOLUTION INTRAMUSCULAR; INTRAVENOUS at 21:54

## 2019-12-14 NOTE — H&P (VIEW-ONLY)
[facility] Consult Note       Referring Provider: No ref. provider found  Reason for Consultation:      Patient Care Team:  Ronnie Miller MD as PCP - General (Family Medicine)    Chief complaint:   Chest pain  Subjective .     History of present illness: 44-year-old white male smoker with no significant past medical history who presents in transfer from [place] ER for possible STEMI.  The present illness apparently started about a week ago when he developed daily fevers.  He has had a cough and perhaps a scratchy throat but no significant productive cough nausea vomiting or diarrhea or abdominal pain.  He was seen in an outpatient clinic today and prescribed Sudafed based cough medication as well as over-the-counter cold medications.  After being seen in the outpatient clinic the patient developed chest pain.  The described as a discomfort in the epigastrium which felt like gas.  It was not relieved after 30 minutes so he presented to the ER at [place] where an EKG there showed diffuse ST segment elevation.  He was given heparin Plavix aspirin and then transferred here for STEMI protocol.  On arrival he is essentially pain-free.  He has no prior history of stroke TIA congestive heart failure myocardial infarction.  He is nondiabetic he is a smoker nonhypertensive lipid status unknown family history negative for precocious coronary artery disease.    Review of Systems  A 14 point review of systems was negative except as was stated in the HPI    History  No past medical history on file.  Past Surgical History:   Procedure Laterality Date   • NO PAST SURGERIES       Family History   Adopted: Yes     Social History     Tobacco Use   • Smoking status: Current Every Day Smoker     Packs/day: 0.25     Types: Cigarettes   • Smokeless tobacco: Never Used   Substance Use Topics   • Alcohol use: Yes     Comment: very little   • Drug use: Yes     Types: Marijuana     Comment: occasionally will spoke      Medications Prior to Admission   Medication Sig Dispense Refill Last Dose   • omeprazole (PRILOSEC) 40 MG capsule Take 1 capsule by mouth Daily. 30 capsule 2 Not Taking     Scheduled Meds:    Continuous Infusions:    No current facility-administered medications for this encounter.   PRN Meds:     Allergies:  Patient has no known allergies.    Objective     Vital Sign Min/Max for last 24 hours  Temp  Min: 98.6 °F (37 °C)  Max: 98.6 °F (37 °C)   BP  Min: 110/66  Max: 158/106   Pulse  Min: 107  Max: 142   Resp  Min: 18  Max: 18   SpO2  Min: 95 %  Max: 99 %   No data recorded   Weight  Min: 80.2 kg (176 lb 12.8 oz)  Max: 80.2 kg (176 lb 12.8 oz)              Physical Exam:  General Appearance: Somewhat pale appearing young white male in no acute distress  Lungs: Clear to auscultation  Heart:: RRR  No Murmurs, Rubs or Gallops  Abdomen: Soft and nontender with adequate bowel sounds.  No organomegaly  Extremities: No cyanosis, clubbing or edema  Pulses: Pulses palpable and equal bilaterally  Skin: Warm and dry with no rash  Psych: Normal  Results Review:         * No active hospital problems. *              Impression      Chest pain with diffuse ST elevation.            Plan       We will proceed with coronary angiography this evening.  Further will depend the results of that testing.        I discussed the patients findings and my recommendations with patient and family    Clarence Khalil MD  12/13/19  8:02 PM    Time:

## 2019-12-14 NOTE — PLAN OF CARE
Problem: Patient Care Overview  Goal: Plan of Care Review  Outcome: Ongoing (interventions implemented as appropriate)  Flowsheets  Taken 12/14/2019 0651  Progress: no change  Outcome Summary: Pt came up from cath lab at approx 2030. No interventions necessary in cath lab per Dr. Khalil. Pt has been tachycardic much of the night. approx 0400 pt c/o severe 10/10 stabbing right rib pain that does not radiate. Morphine given x2 one time doses. Pt spiked temp of 102.2 at 0400. PRN tylenol ordered and administered. No c/o chest pain overnight. Troponins trending up. Will continue to monitor. Echo scheduled for today.  Taken 12/14/2019 0600  Plan of Care Reviewed With: patient;significant other

## 2019-12-14 NOTE — PROGRESS NOTES
INTENSIVIST   PROGRESS NOTE     Hospital:  LOS: 1 day     Mr. Schneider, 44 y.o. male is followed for a Chief Complaint of: Fever      Subjective   S     Interval History:  No acute events. Continues to have fever. Complaining of right sided sharp chest pain which is worse with deep breathing.        The patient's relevant past medical, surgical and social history were reviewed and updated in Epic as appropriate.      ROS:   Constitutional: Positive for fever.   Respiratory: Negative for dyspnea.   Cardiovascular: Positive for chest pain.   Gastrointestinal: Negative for  nausea, vomiting and diarrhea.     Objective   O     Vitals:  Temp  Min: 98.2 °F (36.8 °C)  Max: 102.2 °F (39 °C)  BP  Min: 103/68  Max: 158/106  Pulse  Min: 97  Max: 142  Resp  Min: 18  Max: 24  SpO2  Min: 91 %  Max: 100 % No data recorded    Intake/Ouptut 24 hrs (7:00AM - 6:59 AM)  Intake & Output (last 3 days)       12/11 0701 - 12/12 0700 12/12 0701 - 12/13 0700 12/13 0701 - 12/14 0700 12/14 0701 - 12/15 0700    IV Piggyback   100     Total Intake   100     Urine   800     Total Output   800     Net   -700                     Physical Examination  Telemetry:  Normal sinus rhythm.    Constitutional:  No acute distress.  Sitting up in bed.    Cardiovascular: Normal rate, regular and rhythm. Normal heart sounds.  No murmurs, gallop or rub.   Respiratory: No respiratory distress. Normal respiratory effort.  Rhonchi in the right lower lobe.    Abdominal:  Soft. No masses. Non-tender. No distension. No HSM.   Extremities: No digital cyanosis. No clubbing.  No peripheral edema.   Neurological:   Alert and Oriented to person, place, and time.              Results from last 7 days   Lab Units 12/14/19  0358   WBC 10*3/mm3 14.98*   HEMOGLOBIN g/dL 11.7*   MCV fL 91.9   PLATELETS 10*3/mm3 286     Results from last 7 days   Lab Units 12/14/19  0358   SODIUM mmol/L 136   POTASSIUM mmol/L 3.4*   CO2 mmol/L 20.0*   CREATININE mg/dL 0.76   GLUCOSE mg/dL  149*   MAGNESIUM mg/dL 1.9   PHOSPHORUS mg/dL 2.9     Estimated Creatinine Clearance: 143.6 mL/min (by C-G formula based on SCr of 0.76 mg/dL).              Images:  Imaging Results (Last 24 Hours)     Procedure Component Value Units Date/Time    XR Chest 1 View [[id number]] Collected:  12/13/19 2150     Updated:  12/13/19 2152    Narrative:       Chest x-ray portable    INDICATION: URI, history of heart catheter today.    TECHNIQUE: Single frontal portable view the chest is reviewed. No comparison.    FINDINGS: There is a infiltrate in the right lower lobe posterolaterally which could reflect aspiration or pneumonia. Follow-up to clearing is recommended. Lungs are otherwise clear. No congestive failure or definite pleural effusion or pneumothorax.      Impression:       1. There is a infiltrate in the right lower lobe posterolaterally most concerning for aspiration or pneumonia. Follow-up to complete clearing is recommended.    Signer Name: Azucena Fang MD   Signed: 12/13/2019 9:50 PM   [email or web address]    [facility]            Results: Reviewed.  I reviewed the patient's new laboratory and imaging results.  I independently reviewed the patient's new images.    Medications: Reviewed.    Assessment/Plan   A / P     Mr. Ulrich is a 43yo M with a history of recent dental infection on Keflex who presented with chest pain and fever. He was found to have diffuse ST changes on EKG and a LHC was performed which was unremarkable with the exception of some coronary spasm. He is currently admitted with acute pericarditis. CXR was performed and revealed a right lower lobe infiltrate. He has been started on Rocephin and Azithromycin. Respiratory panel is positive for adenovirus.       Nutrition:   Diet Regular; Cardiac  Advance Directives:   Code Status and Medical Interventions:   Ordered at: 12/13/19 2238     Code Status:    CPR     Medical Interventions (Level of Support Prior to Arrest):     Full       Active Hospital Problems    Diagnosis   • **Pericarditis   • Pleurisy   • Upper respiratory infection; viral versus bacterial   • Dental infection   • Tobacco abuse   • Community acquired pneumonia of right lower lobe of lung (CMS/HCC)       Assessment / Plan:    1. Continue Ibuprofen.   2. Supportive care for adenovirus infection with droplet precautions.   3. Continue to follow cultures.   4. Continue Rocephin and Azithromycin.  5. Echocardiogram pending.   6. Replace potassium and magnesium.   7. CXR in the AM  8. AM labs    Plan of care and goals reviewed during interdisciplinary rounds.  I discussed the patient's findings and my recommendations with patient, family and primary care team      Marquita Galindo, DO    Intensive Care Medicine and Pulmonary Medicine

## 2019-12-14 NOTE — CONSULTS
[facility] Consult Note       Referring Provider: No ref. provider found  Reason for Consultation:      Patient Care Team:  Ronnie Miller MD as PCP - General (Family Medicine)    Chief complaint:   Chest pain  Subjective .     History of present illness: 44-year-old white male smoker with no significant past medical history who presents in transfer from [place] ER for possible STEMI.  The present illness apparently started about a week ago when he developed daily fevers.  He has had a cough and perhaps a scratchy throat but no significant productive cough nausea vomiting or diarrhea or abdominal pain.  He was seen in an outpatient clinic today and prescribed Sudafed based cough medication as well as over-the-counter cold medications.  After being seen in the outpatient clinic the patient developed chest pain.  The described as a discomfort in the epigastrium which felt like gas.  It was not relieved after 30 minutes so he presented to the ER at [place] where an EKG there showed diffuse ST segment elevation.  He was given heparin Plavix aspirin and then transferred here for STEMI protocol.  On arrival he is essentially pain-free.  He has no prior history of stroke TIA congestive heart failure myocardial infarction.  He is nondiabetic he is a smoker nonhypertensive lipid status unknown family history negative for precocious coronary artery disease.    Review of Systems  A 14 point review of systems was negative except as was stated in the HPI    History  No past medical history on file.  Past Surgical History:   Procedure Laterality Date   • NO PAST SURGERIES       Family History   Adopted: Yes     Social History     Tobacco Use   • Smoking status: Current Every Day Smoker     Packs/day: 0.25     Types: Cigarettes   • Smokeless tobacco: Never Used   Substance Use Topics   • Alcohol use: Yes     Comment: very little   • Drug use: Yes     Types: Marijuana     Comment: occasionally will spoke      Medications Prior to Admission   Medication Sig Dispense Refill Last Dose   • omeprazole (PRILOSEC) 40 MG capsule Take 1 capsule by mouth Daily. 30 capsule 2 Not Taking     Scheduled Meds:    Continuous Infusions:    No current facility-administered medications for this encounter.   PRN Meds:     Allergies:  Patient has no known allergies.    Objective     Vital Sign Min/Max for last 24 hours  Temp  Min: 98.6 °F (37 °C)  Max: 98.6 °F (37 °C)   BP  Min: 110/66  Max: 158/106   Pulse  Min: 107  Max: 142   Resp  Min: 18  Max: 18   SpO2  Min: 95 %  Max: 99 %   No data recorded   Weight  Min: 80.2 kg (176 lb 12.8 oz)  Max: 80.2 kg (176 lb 12.8 oz)              Physical Exam:  General Appearance: Somewhat pale appearing young white male in no acute distress  Lungs: Clear to auscultation  Heart:: RRR  No Murmurs, Rubs or Gallops  Abdomen: Soft and nontender with adequate bowel sounds.  No organomegaly  Extremities: No cyanosis, clubbing or edema  Pulses: Pulses palpable and equal bilaterally  Skin: Warm and dry with no rash  Psych: Normal  Results Review:         * No active hospital problems. *              Impression      Chest pain with diffuse ST elevation.            Plan       We will proceed with coronary angiography this evening.  Further will depend the results of that testing.        I discussed the patients findings and my recommendations with patient and family    Clarence Khalil MD  12/13/19  8:02 PM    Time:

## 2019-12-14 NOTE — PROGRESS NOTES
[facility] Progress Note      LOS: 1 day   Patient Care Team:  Ronnie Miller MD as PCP - General (Family Medicine)    Chief Complaint:  No chief complaint on file.      Subjective     Interval History:     Patient Complaints:        Review of Systems:   A 14 point review of systems was negative except as was stated in the HPI      Objective     Vital Sign Min/Max for last 24 hours  Temp  Min: 98.2 °F (36.8 °C)  Max: 102.2 °F (39 °C)   BP  Min: 103/68  Max: 158/106   Pulse  Min: 97  Max: 142   Resp  Min: 18  Max: 24   SpO2  Min: 91 %  Max: 100 %   No data recorded   Weight  Min: 80.2 kg (176 lb 12.8 oz)  Max: 80.2 kg (176 lb 12.8 oz)         Physical Exam:  General Appearance: Alert, appears stated age and cooperative  Lungs: Clear to auscultation  Heart:: RRR   No Murmurs, Rubs or Gallops  Abdomen: Soft and nontender with adequate bowel sounds.  No organomegaly  Extremities: No cyanosis, clubbing or edema  Pulses: Pulses palpable and equal bilaterally  Skin: Warm and dry with no rash  Psych: Normal     Results Review:     I reviewed the patient's new clinical results.  Results from last 7 days   Lab Units 12/14/19  0358   SODIUM mmol/L 136   POTASSIUM mmol/L 3.4*   CHLORIDE mmol/L 100   CO2 mmol/L 20.0*   BUN mg/dL 14   CREATININE mg/dL 0.76   GLUCOSE mg/dL 149*   CALCIUM mg/dL 9.1     Results from last 7 days   Lab Units 12/14/19  0358   WBC 10*3/mm3 14.98*   HEMOGLOBIN g/dL 11.7*   HEMATOCRIT % 35.0*   PLATELETS 10*3/mm3 286     Lab Results   Lab Value Date/Time    TROPONINT 1.060 (C) 12/14/2019 0358    TROPONINT 0.746 (C) 12/13/2019 2316     Results from last 7 days   Lab Units 12/14/19  0358   CHOLESTEROL mg/dL 99   TRIGLYCERIDES mg/dL 270*   HDL CHOL mg/dL 12*   LDL CHOL mg/dL 33                   Medication Review: yes  Current Facility-Administered Medications   Medication Dose Route Frequency Provider Last Rate Last Dose   • acetaminophen (TYLENOL) tablet  650 mg  650 mg Oral Q6H PRN Bobby Joseph APRN   650 mg at 12/14/19 0432   • cefTRIAXone (ROCEPHIN) 1 g/100 mL 0.9% NS (MBP)  1 g Intravenous Nightly Charly Prado MD   1 g at 12/14/19 0003    And   • AZITHROMYCIN 500 MG/250 ML 0.9% NS IVPB (vial-mate)  500 mg Intravenous Q24H Charly Prado MD   500 mg at 12/14/19 0003   • guaiFENesin-dextromethorphan (ROBITUSSIN DM) 100-10 MG/5ML syrup 5 mL  5 mL Oral Q4H PRN Bobby Joseph APRN   5 mL at 12/14/19 0003   • ibuprofen (ADVIL,MOTRIN) tablet 600 mg  600 mg Oral Daily With Breakfast, Lunch & Dinner Bobby Joseph APRN   600 mg at 12/14/19 0811   • Magnesium Sulfate 2 gram Bolus, followed by 8 gram infusion (total Mg dose 10 grams)- Mg less than or equal to 1mg/dL  2 g Intravenous PRN Case, Marquita V., DO        Or   • Magnesium Sulfate 2 gram / 50mL Infusion (GIVE X 3 BAGS TO EQUAL 6GM TOTAL DOSE) - Mg 1.1 - 1.5 mg/dl  2 g Intravenous PRN Case, Marquita V., DO        Or   • Magnesium Sulfate 4 gram infusion- Mg 1.6-1.9 mg/dL  4 g Intravenous PRN Case, Marquita V., DO       • pantoprazole (PROTONIX) EC tablet 40 mg  40 mg Oral Q AM Bobby Joseph APRN   40 mg at 12/14/19 0612   • potassium chloride (KLOR-CON) packet 40 mEq  40 mEq Oral PRN Case, Marquita V., DO       • potassium chloride (MICRO-K) CR capsule 40 mEq  40 mEq Oral PRN Case, Marquita V., DO             Pericarditis    Upper respiratory infection; viral versus bacterial    Dental infection    Tobacco abuse    Community acquired pneumonia of right lower lobe of lung (CMS/HCC)        Impression      Right lower lobe pneumonia  Probable pericarditis          Plan     Continue supportive care and work-up per intensivist.  Will review echocardiography today                Clarence Khalil MD  12/14/19  9:19 AM

## 2019-12-14 NOTE — PROGRESS NOTES
"     Chief complaint: CP    Subjective     Patient is a 44 y.o. male smoker w/ PMHX of dental infection for which he has been on Amoxicillin.  He has had fevers, myalgias, arthralgias, cough, fatigue, throat irritation, and cough for ~ 1 week.  On 12/11 he developed a rash on his back and went to an outpatient clinic and was prescribed sudafed after a negative rapid strep & influenza test.  He was told this may be due to his amoxicillin and he stopped taking this.  He went to see his regular PCP today who ordered steroids and Keflex (which were not filled as he did not have time).  A repeat rapid influenza was negative as was his monospot.     Afterward leaving his PCP the patient developed epigastric CP.  After 30 minutes of this not improving he presented to the [place] ED where an EKG showed \"diffuse ST segment elevation\".  STEMI protocol was initiated and he was transferred to our facility under the care of Dr. Khalil who took him for a LHC which revealed no obstruction but coronary artery spasm of the distal segment of a marginal branch of the circumflex.  This spasm resolved w/ IC NTG.    He was admitted to the ICU for post catheterization care though the diffuse ST changes were felt to be consistent with pericarditis.        He has had no significant sick contacts.  He does have a history of a measles vaccination to his best knowledge.  He has no headache or stiff neck.  His rash has been improving.    History  History reviewed. No pertinent past medical history.  Past Surgical History:   Procedure Laterality Date   • NO PAST SURGERIES     • WISDOM TOOTH EXTRACTION       Family History   Adopted: Yes     Social History     Tobacco Use   • Smoking status: Current Every Day Smoker     Packs/day: 1.00     Types: Cigarettes   • Smokeless tobacco: Never Used   Substance Use Topics   • Alcohol use: Not Currently     Comment: very little   • Drug use: Not Currently     Comment: occasionally will spoke     No " medications prior to admission.     Allergies:  Patient has no known allergies.    Review of Systems   Pertinent items are noted in HPI, all other systems reviewed and negative      Objective     Vital Signs  Temp:  [98.2 °F (36.8 °C)-98.6 °F (37 °C)] 98.2 °F (36.8 °C)  Heart Rate:  [102-142] 102  Resp:  [18] 18  BP: (110-158)/() 113/90    Physical Exam:    Objective:  General Appearance:  In no acute distress.    Vital signs: (most recent): Blood pressure 113/90, pulse 102, temperature 98.2 °F (36.8 °C), temperature source Oral, resp. rate 18, SpO2 97 %.    HEENT: Normal HEENT exam.  (See no erythema swelling or purulence of the upper left molar in question)    Lungs:  Normal effort and normal respiratory rate.  Breath sounds clear to auscultation.  He is not in respiratory distress.  No rales, wheezes or rhonchi.    Heart: Normal rate.  Regular rhythm.  S1 normal and S2 normal.  No murmur, gallop or friction rub.   Chest: Symmetric chest wall expansion.   Abdomen: Abdomen is soft and non-distended.  Bowel sounds are normal.   There is no abdominal tenderness.   There is no mass. There is no splenomegaly. There is no hepatomegaly.   Extremities: There is no deformity or dependent edema.    Neurological: Patient is alert and oriented to person, place and time.    Pupils:  Pupils are equal, round, and reactive to light.  (Faint maculopapular rash on trunk arms and legs).    Skin:  Warm and dry.              Results Review:    I reviewed the patient's new clinical results.  I reviewed the patient's new imaging results and agree with the interpretation.  I reviewed the patient's other test results and agree with the interpretation  I personally viewed and interpreted the patient's EKG/Telemetry data    Assessment/Plan       Pericarditis    Upper respiratory infection; viral versus bacterial    Dental infection    Tobacco abuse    44-year-old male with acute pericarditis resulting in diffuse ST changes by EKG.   Cardiac catheterization was unremarkable with the exception of some coronary spasm of unclear significance per Dr. Khalil's note.    He appears to have a viral illness associated with systemic symptoms as described above and a viral exanthem.  The rash is not typical of endocarditis or necrotizing fasciitis.  Toxic shock syndrome is unlikely as he is minimally symptomatic otherwise.  Furthermore, the rash is improving.  He has no headache or stiff neck to suggest meningococcus.  He has had a recent negative Monospot.      - ICU admission per cardiology for post catheterization care  - CXR  - ECHO in am  - Blood cultures, Influenza PCR, Resp virus panel, strep U Ag  - smoking cessation counseling  - continue Keflex for dental infection.  - NSAIDs      I discussed the patients findings and my recommendations with patient and nursing staff.     I have seen and examined patient, performing a face-to-face diagnostic evaluation with plan of care reviewed and developed with APRN and nursing staff. I have addended and modified the above history of present illness, physical examination, and assessment and plan to reflect my findings and impressions.    Charly Prado MD  Pulmonary and Critical Care Medicine  12/13/19  10:31 PM      Addendum:    10:45 PM    Chest x-ray shows right lower lobe infiltrate consistent with an acute pneumonia.  Will broaden antibiotics to Rocephin and azithromycin and obtain appropriate cultures and serologies.    Charly Prado MD

## 2019-12-15 ENCOUNTER — APPOINTMENT (OUTPATIENT)
Dept: GENERAL RADIOLOGY | Facility: HOSPITAL | Age: 44
End: 2019-12-15

## 2019-12-15 LAB
ANION GAP SERPL CALCULATED.3IONS-SCNC: 14 MMOL/L (ref 5–15)
BUN BLD-MCNC: 14 MG/DL (ref 6–20)
BUN/CREAT SERPL: 16.9 (ref 7–25)
CALCIUM SPEC-SCNC: 8.6 MG/DL (ref 8.6–10.5)
CHLORIDE SERPL-SCNC: 99 MMOL/L (ref 98–107)
CO2 SERPL-SCNC: 20 MMOL/L (ref 22–29)
CREAT BLD-MCNC: 0.83 MG/DL (ref 0.76–1.27)
DEPRECATED RDW RBC AUTO: 51.2 FL (ref 37–54)
ERYTHROCYTE [DISTWIDTH] IN BLOOD BY AUTOMATED COUNT: 14.6 % (ref 12.3–15.4)
GFR SERPL CREATININE-BSD FRML MDRD: 102 ML/MIN/1.73
GLUCOSE BLD-MCNC: 113 MG/DL (ref 65–99)
HCT VFR BLD AUTO: 32.9 % (ref 37.5–51)
HGB BLD-MCNC: 10.8 G/DL (ref 13–17.7)
MAGNESIUM SERPL-MCNC: 2.4 MG/DL (ref 1.6–2.6)
MCH RBC QN AUTO: 31 PG (ref 26.6–33)
MCHC RBC AUTO-ENTMCNC: 32.8 G/DL (ref 31.5–35.7)
MCV RBC AUTO: 94.5 FL (ref 79–97)
PHOSPHATE SERPL-MCNC: 2.8 MG/DL (ref 2.5–4.5)
PLATELET # BLD AUTO: 328 10*3/MM3 (ref 140–450)
PMV BLD AUTO: 10.4 FL (ref 6–12)
POTASSIUM BLD-SCNC: 4 MMOL/L (ref 3.5–5.2)
RBC # BLD AUTO: 3.48 10*6/MM3 (ref 4.14–5.8)
SODIUM BLD-SCNC: 133 MMOL/L (ref 136–145)
WBC NRBC COR # BLD: 12.53 10*3/MM3 (ref 3.4–10.8)

## 2019-12-15 PROCEDURE — 25010000002 AZITHROMYCIN PER 500 MG: Performed by: INTERNAL MEDICINE

## 2019-12-15 PROCEDURE — 85027 COMPLETE CBC AUTOMATED: CPT | Performed by: INTERNAL MEDICINE

## 2019-12-15 PROCEDURE — 84100 ASSAY OF PHOSPHORUS: CPT | Performed by: INTERNAL MEDICINE

## 2019-12-15 PROCEDURE — 83735 ASSAY OF MAGNESIUM: CPT | Performed by: INTERNAL MEDICINE

## 2019-12-15 PROCEDURE — 80048 BASIC METABOLIC PNL TOTAL CA: CPT | Performed by: INTERNAL MEDICINE

## 2019-12-15 PROCEDURE — 25010000002 CEFTRIAXONE PER 250 MG: Performed by: INTERNAL MEDICINE

## 2019-12-15 PROCEDURE — 71045 X-RAY EXAM CHEST 1 VIEW: CPT

## 2019-12-15 PROCEDURE — 99232 SBSQ HOSP IP/OBS MODERATE 35: CPT | Performed by: INTERNAL MEDICINE

## 2019-12-15 RX ORDER — CLOPIDOGREL BISULFATE 75 MG/1
75 TABLET ORAL DAILY
Status: DISCONTINUED | OUTPATIENT
Start: 2019-12-15 | End: 2019-12-17 | Stop reason: HOSPADM

## 2019-12-15 RX ADMIN — PANTOPRAZOLE SODIUM 40 MG: 40 TABLET, DELAYED RELEASE ORAL at 05:47

## 2019-12-15 RX ADMIN — IBUPROFEN 600 MG: 600 TABLET, FILM COATED ORAL at 17:32

## 2019-12-15 RX ADMIN — IBUPROFEN 600 MG: 600 TABLET, FILM COATED ORAL at 12:17

## 2019-12-15 RX ADMIN — CLOPIDOGREL BISULFATE 75 MG: 75 TABLET ORAL at 11:09

## 2019-12-15 RX ADMIN — AZITHROMYCIN MONOHYDRATE 500 MG: 500 INJECTION, POWDER, LYOPHILIZED, FOR SOLUTION INTRAVENOUS at 00:35

## 2019-12-15 RX ADMIN — ACETAMINOPHEN 650 MG: 325 TABLET ORAL at 04:48

## 2019-12-15 RX ADMIN — GUAIFENESIN AND CODEINE PHOSPHATE 5 ML: 10; 100 LIQUID ORAL at 14:23

## 2019-12-15 RX ADMIN — GUAIFENESIN AND CODEINE PHOSPHATE 5 ML: 10; 100 LIQUID ORAL at 04:49

## 2019-12-15 RX ADMIN — CEFTRIAXONE 1 G: 1 INJECTION, POWDER, FOR SOLUTION INTRAMUSCULAR; INTRAVENOUS at 20:41

## 2019-12-15 RX ADMIN — GUAIFENESIN AND CODEINE PHOSPHATE 5 ML: 10; 100 LIQUID ORAL at 22:00

## 2019-12-15 RX ADMIN — IBUPROFEN 600 MG: 600 TABLET, FILM COATED ORAL at 09:04

## 2019-12-15 NOTE — PLAN OF CARE
Problem: Patient Care Overview  Goal: Plan of Care Review  Outcome: Ongoing (interventions implemented as appropriate)  Flowsheets (Taken 12/15/2019 1705)  Progress: no change  Plan of Care Reviewed With: patient  Outcome Summary: VSS. Encourage to use incentive spirometer, able to achieve 2000ml. Plavix initated per cardiology. Will continue to monitor.

## 2019-12-15 NOTE — PLAN OF CARE
Problem: Patient Care Overview  Goal: Plan of Care Review  Outcome: Ongoing (interventions implemented as appropriate)  Flowsheets (Taken 12/15/2019 1644)  Progress: improving  Plan of Care Reviewed With: patient  Outcome Summary: Patient c/o right rib cage pain, given morphine x1. remains tachycardiac, denies chest pain. PRN tylenol given for tmax 100.6. VSS .

## 2019-12-15 NOTE — PROGRESS NOTES
[facility] Progress Note      LOS: 2 days   Patient Care Team:  Ronnie Miller MD as PCP - General (Family Medicine)    Chief Complaint:  No chief complaint on file.      Subjective     Interval History:     Patient Complaints:  Cp, sob, palpitations.  Feels a little better      Review of Systems:   A 14 point review of systems was negative except as was stated in the HPI      Objective     Vital Sign Min/Max for last 24 hours  Temp  Min: 98 °F (36.7 °C)  Max: 100.6 °F (38.1 °C)   BP  Min: 92/69  Max: 122/74   Pulse  Min: 91  Max: 123   Resp  Min: 18  Max: 24   SpO2  Min: 92 %  Max: 97 %   Flow (L/min)  Min: 2  Max: 2   Weight  Min: 81.2 kg (179 lb 0.2 oz)  Max: 81.2 kg (179 lb 0.2 oz)         Physical Exam:  General Appearance: Alert, appears stated age and cooperative  Lungs: Clear to auscultation  Heart:: RR tachy   No Murmurs, Rubs or Gallops  Abdomen: Soft and nontender with adequate bowel sounds.  No organomegaly  Extremities: No cyanosis, clubbing or edema  Pulses: Pulses palpable and equal bilaterally  Skin: Warm and dry with no rash  Psych: Normal     Results Review:     I reviewed the patient's new clinical results.  Results from last 7 days   Lab Units 12/15/19  0359 12/14/19  1921 12/14/19  0358   SODIUM mmol/L 133*  --  136   POTASSIUM mmol/L 4.0 3.9 3.4*   CHLORIDE mmol/L 99  --  100   CO2 mmol/L 20.0*  --  20.0*   BUN mg/dL 14  --  14   CREATININE mg/dL 0.83  --  0.76   GLUCOSE mg/dL 113*  --  149*   CALCIUM mg/dL 8.6  --  9.1     Results from last 7 days   Lab Units 12/15/19  0400 12/14/19  0358   WBC 10*3/mm3 12.53* 14.98*   HEMOGLOBIN g/dL 10.8* 11.7*   HEMATOCRIT % 32.9* 35.0*   PLATELETS 10*3/mm3 328 286     Lab Results   Lab Value Date/Time    TROPONINT 1.060 (C) 12/14/2019 0358    TROPONINT 0.746 (C) 12/13/2019 2316     Results from last 7 days   Lab Units 12/14/19  0358   CHOLESTEROL mg/dL 99   TRIGLYCERIDES mg/dL 270*   HDL CHOL mg/dL 12*    LDL CHOL mg/dL 33                   Medication Review: yes  Current Facility-Administered Medications   Medication Dose Route Frequency Provider Last Rate Last Dose   • acetaminophen (TYLENOL) tablet 650 mg  650 mg Oral Q6H PRN Bobby Joseph APRN   650 mg at 12/15/19 0448   • cefTRIAXone (ROCEPHIN) 1 g/100 mL 0.9% NS (MBP)  1 g Intravenous Nightly Charly Prado MD   1 g at 12/14/19 2042    And   • AZITHROMYCIN 500 MG/250 ML 0.9% NS IVPB (vial-mate)  500 mg Intravenous Q24H Charly Prado MD   500 mg at 12/15/19 0035   • clopidogrel (PLAVIX) tablet 75 mg  75 mg Oral Daily Clarence Khalil MD       • guaiFENesin-codeine (ROMILAR-AC) syrup 5 mL  5 mL Oral Q4H PRN Bobby Joseph APRN   5 mL at 12/15/19 0449   • ibuprofen (ADVIL,MOTRIN) tablet 600 mg  600 mg Oral Daily With Breakfast, Lunch & Dinner Bobby Joseph APRN   600 mg at 12/15/19 0904   • Magnesium Sulfate 2 gram Bolus, followed by 8 gram infusion (total Mg dose 10 grams)- Mg less than or equal to 1mg/dL  2 g Intravenous PRN Case, Marquita V., DO        Or   • Magnesium Sulfate 2 gram / 50mL Infusion (GIVE X 3 BAGS TO EQUAL 6GM TOTAL DOSE) - Mg 1.1 - 1.5 mg/dl  2 g Intravenous PRN Case, Marquita V., DO        Or   • Magnesium Sulfate 4 gram infusion- Mg 1.6-1.9 mg/dL  4 g Intravenous PRN Case, Marquita V., DO 25 mL/hr at 12/14/19 1006 4 g at 12/14/19 1006   • melatonin tablet 5 mg  5 mg Oral Nightly PRN Bobby Joseph APRN       • pantoprazole (PROTONIX) EC tablet 40 mg  40 mg Oral Q AM Bobby Joseph APRN   40 mg at 12/15/19 0547   • potassium chloride (KLOR-CON) packet 40 mEq  40 mEq Oral PRN Case, Marquita V., DO       • potassium chloride (MICRO-K) CR capsule 40 mEq  40 mEq Oral PRN Case, Marquita V., DO   40 mEq at 12/14/19 1415         Pericarditis    Upper respiratory infection; viral versus bacterial    Dental infection    Tobacco abuse    Community acquired pneumonia of right lower lobe of lung  (CMS/HCC)    Pleurisy        Impression      Right lower lobe pneumonia  Probable pericarditis          Plan     Continue supportive care and work-up per intensivist.   Normal EF   No pericardial effusion  Note that he had a small troponin bump on admission.  He had also presented with chest pain and was noted to have spasm in the distal branch of his circumflex marginal at cardiac catheterization.  Although his troponin bump could be from pericarditis in conjunction with his pneumonia I do not think that we can exclude a small non-STEMI either.  For that reason I am going to put him on Plavix 75 mg daily.              MD Gregor Alvarado PA  12/15/19  9:51 AM

## 2019-12-15 NOTE — PROGRESS NOTES
Discharge Planning Assessment  [facility]     Patient Name: Boom Ulrich  MRN: [id number]  Today's Date: 12/15/2019    Admit Date: 12/13/2019    Discharge Needs Assessment     Row Name 12/15/19 1020       Living Environment    Lives With  significant other;child(giuseppe), dependent    Name(s) of Who Lives With Patient  NIKKI PIERCE, [phone]    Primary Care Provided by  self    Provides Primary Care For  child(giuseppe)    Family Caregiver if Needed  significant other    Family Caregiver Names  KARI    Quality of Family Relationships  helpful;involved;supportive    Able to Return to Prior Arrangements  yes       Resource/Environmental Concerns    Transportation Concerns  car, none       Discharge Needs Assessment    Readmission Within the Last 30 Days  no previous admission in last 30 days    Concerns to be Addressed  denies needs/concerns at this time    Anticipated Changes Related to Illness  none    Equipment Needed After Discharge  none    Provided post acute provider list?  Refused HOME WHEN READY    Patient's Choice of Community Agency(s)  NA        Discharge Plan     Row Name 12/15/19 1022       Plan    Plan  INITIAL    Patient/Family in Agreement with Plan  yes    Plan Comments  PT LIVES IN [facility] WITH HIS GIRLFRIEND AND THEIR DEPENDENT CHILDREN. HE IS EMPLOYED FULL-TIME AND DENIES ANY CURRENT NEEDS. PCP IS ELLEN MCKEE    Final Discharge Disposition Code  01 - home or self-care        Destination      Coordination has not been started for this encounter.      Durable Medical Equipment      Coordination has not been started for this encounter.      Dialysis/Infusion      Coordination has not been started for this encounter.      Home Medical Care      Coordination has not been started for this encounter.      Therapy      Coordination has not been started for this encounter.      Community Resources      Coordination has not been started for this encounter.          Demographic Summary    No  documentation.       Functional Status     Row Name 12/15/19 1019       Functional Status    Usual Activity Tolerance  good    Current Activity Tolerance  moderate       Functional Status, IADL    Medications  independent    Meal Preparation  assistive person    Housekeeping  assistive person    Laundry  assistive person    Shopping  assistive person    IADL Comments  LIVES WITH HIS GIRLFRIENDAKRI       Mental Status    General Appearance WDL  WDL       Mental Status Summary    Recent Changes in Mental Status/Cognitive Functioning  no changes       Employment/    Employment Status  employed full time        Psychosocial    No documentation.       Abuse/Neglect    No documentation.       Legal    No documentation.       Substance Abuse    No documentation.       Patient Forms    No documentation.           Jodie Zhang RN

## 2019-12-15 NOTE — PROGRESS NOTES
INTENSIVIST   PROGRESS NOTE     Hospital:  LOS: 2 days     Mr. Schneider, 44 y.o. male is followed for a Chief Complaint of: Fever      Subjective   S     Interval History:  No acute events. Some right sided chest pain.         The patient's relevant past medical, surgical and social history were reviewed and updated in Epic as appropriate.      ROS:   Constitutional: Positive for fever.   Respiratory: Negative for dyspnea.   Cardiovascular: Positive for chest pain.   Gastrointestinal: Negative for  nausea, vomiting and diarrhea.     Objective   O     Vitals:  Temp  Min: 98 °F (36.7 °C)  Max: 100.6 °F (38.1 °C)  BP  Min: 92/69  Max: 122/74  Pulse  Min: 91  Max: 123  Resp  Min: 18  Max: 24  SpO2  Min: 92 %  Max: 97 % Flow (L/min)  Min: 2  Max: 2    Intake/Ouptut 24 hrs (7:00AM - 6:59 AM)  Intake & Output (last 3 days)       12/12 0701 - 12/13 0700 12/13 0701 - 12/14 0700 12/14 0701 - 12/15 0700 12/15 0701 - 12/16 0700    P.O.   610     I.V. (mL/kg)   659.6 (8.1) 20 (0.2)    IV Piggyback  100 250     Total Intake(mL/kg)  100 1519.6 (18.7) 20 (0.2)    Urine (mL/kg/hr)  800 1225 (0.6)     Total Output  800 1225     Net  -700 +294.6 +20                    Physical Examination  Telemetry:  Normal sinus rhythm.    Constitutional:  No acute distress.  Sitting up in bed.    Cardiovascular: Normal rate, regular and rhythm. Normal heart sounds.  No murmurs, gallop or rub.   Respiratory: No respiratory distress. Normal respiratory effort.  Rhonchi in the right lower lobe.    Abdominal:  Soft. No masses. Non-tender. No distension. No HSM.   Extremities: No digital cyanosis. No clubbing.  No peripheral edema.   Neurological:   Alert and Oriented to person, place, and time.              Results from last 7 days   Lab Units 12/15/19  0400 12/14/19  0358   WBC 10*3/mm3 12.53* 14.98*   HEMOGLOBIN g/dL 10.8* 11.7*   MCV fL 94.5 91.9   PLATELETS 10*3/mm3 328 286     Results from last 7 days   Lab Units 12/15/19  0359 12/14/19  1926  12/14/19  0358   SODIUM mmol/L 133*  --  136   POTASSIUM mmol/L 4.0 3.9 3.4*   CO2 mmol/L 20.0*  --  20.0*   CREATININE mg/dL 0.83  --  0.76   GLUCOSE mg/dL 113*  --  149*   MAGNESIUM mg/dL 2.4  --  1.9   PHOSPHORUS mg/dL 2.8  --  2.9     Estimated Creatinine Clearance: 133.2 mL/min (by C-G formula based on SCr of 0.83 mg/dL).              Images:  Imaging Results (Last 24 Hours)     Procedure Component Value Units Date/Time    XR Chest 1 View [[id number]] Collected:  12/15/19 0850     Updated:  12/15/19 0851    Narrative:          EXAMINATION: XR CHEST 1 VW-      INDICATION: Follow up right lower lobe pneumonia      COMPARISON: 12/13/2019.     FINDINGS: There is airspace disease in the right lower lobe which has  progressed slightly since previous examination of 12/13/2019. There is  also a new small airspace process in the left lower lobe. The heart size  is normal and the heart is radiographically compensated.           Impression:       Increasing right basilar airspace disease as well as a new  left basilar airspace process when compared with 12/13/2019.                  Results: Reviewed.  I reviewed the patient's new laboratory and imaging results.  I independently reviewed the patient's new images.    Medications: Reviewed.    Assessment/Plan   A / P     Mr. Ulrich is a 43yo M with a history of recent dental infection on Keflex who presented with chest pain and fever. He was found to have diffuse ST changes on EKG and a LHC was performed which was unremarkable with the exception of some coronary spasm. He is currently admitted with acute pericarditis. CXR was performed and revealed a right lower lobe infiltrate. He has been started on Rocephin and Azithromycin. Respiratory panel is positive for adenovirus.       Nutrition:   Diet Regular; Cardiac  Advance Directives:   Code Status and Medical Interventions:   Ordered at: 12/13/19 2232     Code Status:    CPR     Medical Interventions (Level of Support Prior  to Arrest):    Full       Active Hospital Problems    Diagnosis   • **Pericarditis   • Pleurisy   • Upper respiratory infection; viral versus bacterial   • Dental infection   • Tobacco abuse   • Community acquired pneumonia of right lower lobe of lung (CMS/HCC)       Assessment / Plan:    1. Continue Ibuprofen.    2. Continue to follow cultures.   3. Continue Rocephin and Azithromycin.  4. CXR in the AM  5. AM labs  6. Okay to transfer to telemetry when suitable per Cardiology. Pulmonary consults will continue to follow.     Plan of care and goals reviewed during interdisciplinary rounds.  I discussed the patient's findings and my recommendations with patient and family      Marquita Galindo, DO    Intensive Care Medicine and Pulmonary Medicine

## 2019-12-16 ENCOUNTER — APPOINTMENT (OUTPATIENT)
Dept: GENERAL RADIOLOGY | Facility: HOSPITAL | Age: 44
End: 2019-12-16

## 2019-12-16 ENCOUNTER — DOCUMENTATION (OUTPATIENT)
Dept: CARDIAC REHAB | Facility: HOSPITAL | Age: 44
End: 2019-12-16

## 2019-12-16 LAB
ANION GAP SERPL CALCULATED.3IONS-SCNC: 12 MMOL/L (ref 5–15)
BACTERIA SPEC RESP CULT: NORMAL
BASOPHILS # BLD AUTO: 0.04 10*3/MM3 (ref 0–0.2)
BASOPHILS NFR BLD AUTO: 0.4 % (ref 0–1.5)
BUN BLD-MCNC: 13 MG/DL (ref 6–20)
BUN/CREAT SERPL: 15.1 (ref 7–25)
CALCIUM SPEC-SCNC: 8.8 MG/DL (ref 8.6–10.5)
CHLORIDE SERPL-SCNC: 100 MMOL/L (ref 98–107)
CO2 SERPL-SCNC: 24 MMOL/L (ref 22–29)
CREAT BLD-MCNC: 0.86 MG/DL (ref 0.76–1.27)
DEPRECATED RDW RBC AUTO: 49.5 FL (ref 37–54)
EOSINOPHIL # BLD AUTO: 0.25 10*3/MM3 (ref 0–0.4)
EOSINOPHIL NFR BLD AUTO: 2.2 % (ref 0.3–6.2)
ERYTHROCYTE [DISTWIDTH] IN BLOOD BY AUTOMATED COUNT: 14.5 % (ref 12.3–15.4)
GFR SERPL CREATININE-BSD FRML MDRD: 98 ML/MIN/1.73
GLUCOSE BLD-MCNC: 100 MG/DL (ref 65–99)
GRAM STN SPEC: NORMAL
HCT VFR BLD AUTO: 32.3 % (ref 37.5–51)
HGB BLD-MCNC: 10.5 G/DL (ref 13–17.7)
IMM GRANULOCYTES # BLD AUTO: 0.42 10*3/MM3 (ref 0–0.05)
IMM GRANULOCYTES NFR BLD AUTO: 3.8 % (ref 0–0.5)
LYMPHOCYTES # BLD AUTO: 2.83 10*3/MM3 (ref 0.7–3.1)
LYMPHOCYTES NFR BLD AUTO: 25.4 % (ref 19.6–45.3)
MAGNESIUM SERPL-MCNC: 2.3 MG/DL (ref 1.6–2.6)
MCH RBC QN AUTO: 30.4 PG (ref 26.6–33)
MCHC RBC AUTO-ENTMCNC: 32.5 G/DL (ref 31.5–35.7)
MCV RBC AUTO: 93.6 FL (ref 79–97)
MONOCYTES # BLD AUTO: 0.77 10*3/MM3 (ref 0.1–0.9)
MONOCYTES NFR BLD AUTO: 6.9 % (ref 5–12)
NEUTROPHILS # BLD AUTO: 6.82 10*3/MM3 (ref 1.7–7)
NEUTROPHILS NFR BLD AUTO: 61.3 % (ref 42.7–76)
NRBC BLD AUTO-RTO: 0 /100 WBC (ref 0–0.2)
PHOSPHATE SERPL-MCNC: 2.7 MG/DL (ref 2.5–4.5)
PLAT MORPH BLD: NORMAL
PLATELET # BLD AUTO: 367 10*3/MM3 (ref 140–450)
PMV BLD AUTO: 10.1 FL (ref 6–12)
POTASSIUM BLD-SCNC: 4 MMOL/L (ref 3.5–5.2)
RBC # BLD AUTO: 3.45 10*6/MM3 (ref 4.14–5.8)
RBC MORPH BLD: NORMAL
SODIUM BLD-SCNC: 136 MMOL/L (ref 136–145)
WBC MORPH BLD: NORMAL
WBC NRBC COR # BLD: 11.13 10*3/MM3 (ref 3.4–10.8)

## 2019-12-16 PROCEDURE — 83735 ASSAY OF MAGNESIUM: CPT | Performed by: INTERNAL MEDICINE

## 2019-12-16 PROCEDURE — 71045 X-RAY EXAM CHEST 1 VIEW: CPT

## 2019-12-16 PROCEDURE — 85025 COMPLETE CBC W/AUTO DIFF WBC: CPT | Performed by: INTERNAL MEDICINE

## 2019-12-16 PROCEDURE — 80048 BASIC METABOLIC PNL TOTAL CA: CPT | Performed by: INTERNAL MEDICINE

## 2019-12-16 PROCEDURE — 84100 ASSAY OF PHOSPHORUS: CPT | Performed by: INTERNAL MEDICINE

## 2019-12-16 PROCEDURE — 99232 SBSQ HOSP IP/OBS MODERATE 35: CPT | Performed by: INTERNAL MEDICINE

## 2019-12-16 PROCEDURE — 85007 BL SMEAR W/DIFF WBC COUNT: CPT | Performed by: INTERNAL MEDICINE

## 2019-12-16 PROCEDURE — 25010000002 AZITHROMYCIN PER 500 MG: Performed by: INTERNAL MEDICINE

## 2019-12-16 RX ORDER — AMOXICILLIN AND CLAVULANATE POTASSIUM 875; 125 MG/1; MG/1
1 TABLET, FILM COATED ORAL EVERY 12 HOURS SCHEDULED
Status: DISCONTINUED | OUTPATIENT
Start: 2019-12-16 | End: 2019-12-17 | Stop reason: HOSPADM

## 2019-12-16 RX ADMIN — PANTOPRAZOLE SODIUM 40 MG: 40 TABLET, DELAYED RELEASE ORAL at 06:34

## 2019-12-16 RX ADMIN — CLOPIDOGREL BISULFATE 75 MG: 75 TABLET ORAL at 08:11

## 2019-12-16 RX ADMIN — IBUPROFEN 600 MG: 600 TABLET, FILM COATED ORAL at 13:05

## 2019-12-16 RX ADMIN — IBUPROFEN 600 MG: 600 TABLET, FILM COATED ORAL at 17:06

## 2019-12-16 RX ADMIN — AZITHROMYCIN MONOHYDRATE 500 MG: 500 INJECTION, POWDER, LYOPHILIZED, FOR SOLUTION INTRAVENOUS at 00:49

## 2019-12-16 RX ADMIN — IBUPROFEN 600 MG: 600 TABLET, FILM COATED ORAL at 08:11

## 2019-12-16 RX ADMIN — AMOXICILLIN AND CLAVULANATE POTASSIUM 1 TABLET: 875; 125 TABLET, FILM COATED ORAL at 20:57

## 2019-12-16 RX ADMIN — AMOXICILLIN AND CLAVULANATE POTASSIUM 1 TABLET: 875; 125 TABLET, FILM COATED ORAL at 13:40

## 2019-12-16 NOTE — PROGRESS NOTES
[facility] Progress Note      LOS: 3 days   Patient Care Team:  Ronnie Miller MD as PCP - General (Family Medicine)    Chief Complaint:  No chief complaint on file.      Subjective     Interval History:     Patient Complaints:  Cp, sob, palpitations.  C/o fever      Review of Systems:   A 14 point review of systems was negative except as was stated in the HPI      Objective     Vital Sign Min/Max for last 24 hours  Temp  Min: 97.8 °F (36.6 °C)  Max: 99.3 °F (37.4 °C)   BP  Min: 87/64  Max: 116/79   Pulse  Min: 90  Max: 120   Resp  Min: 16  Max: 20   SpO2  Min: 89 %  Max: 96 %   Flow (L/min)  Min: 2  Max: 2   No data recorded         Physical Exam:  General Appearance: Alert, appears stated age and cooperative  Lungs: Clear to auscultation  Heart:: RR tachy   No Murmurs, Rubs or Gallops  Abdomen: Soft and nontender with adequate bowel sounds.  No organomegaly  Extremities: No cyanosis, clubbing or edema  Pulses: Pulses palpable and equal bilaterally  Skin: Warm and dry with no rash  Psych: Normal     Results Review:     I reviewed the patient's new clinical results.  Results from last 7 days   Lab Units 12/16/19  0417 12/15/19  0359 12/14/19  1921 12/14/19  0358   SODIUM mmol/L 136 133*  --  136   POTASSIUM mmol/L 4.0 4.0 3.9 3.4*   CHLORIDE mmol/L 100 99  --  100   CO2 mmol/L 24.0 20.0*  --  20.0*   BUN mg/dL 13 14  --  14   CREATININE mg/dL 0.86 0.83  --  0.76   GLUCOSE mg/dL 100* 113*  --  149*   CALCIUM mg/dL 8.8 8.6  --  9.1     Results from last 7 days   Lab Units 12/16/19  0417 12/15/19  0400 12/14/19 0358   WBC 10*3/mm3 11.13* 12.53* 14.98*   HEMOGLOBIN g/dL 10.5* 10.8* 11.7*   HEMATOCRIT % 32.3* 32.9* 35.0*   PLATELETS 10*3/mm3 367 328 286     Lab Results   Lab Value Date/Time    TROPONINT 1.060 (C) 12/14/2019 0358    TROPONINT 0.746 (C) 12/13/2019 2316     Results from last 7 days   Lab Units 12/14/19  0358   CHOLESTEROL mg/dL 99   TRIGLYCERIDES mg/dL 270*    HDL CHOL mg/dL 12*   LDL CHOL mg/dL 33                   Medication Review: yes  Current Facility-Administered Medications   Medication Dose Route Frequency Provider Last Rate Last Dose   • acetaminophen (TYLENOL) tablet 650 mg  650 mg Oral Q6H PRN Bobby Joseph APRN   650 mg at 12/15/19 0448   • cefTRIAXone (ROCEPHIN) 1 g/100 mL 0.9% NS (MBP)  1 g Intravenous Nightly Charly Prado MD   1 g at 12/15/19 2041    And   • AZITHROMYCIN 500 MG/250 ML 0.9% NS IVPB (vial-mate)  500 mg Intravenous Q24H Charly Prado MD   500 mg at 12/16/19 0049   • clopidogrel (PLAVIX) tablet 75 mg  75 mg Oral Daily Clarence Khalil MD   75 mg at 12/16/19 0811   • guaiFENesin-codeine (ROMILAR-AC) syrup 5 mL  5 mL Oral Q4H PRN Bobby Joseph APRN   5 mL at 12/15/19 2200   • ibuprofen (ADVIL,MOTRIN) tablet 600 mg  600 mg Oral Daily With Breakfast, Lunch & Dinner Bobby Joseph APRN   600 mg at 12/16/19 0811   • Magnesium Sulfate 2 gram Bolus, followed by 8 gram infusion (total Mg dose 10 grams)- Mg less than or equal to 1mg/dL  2 g Intravenous PRN Case, Marquita V., DO        Or   • Magnesium Sulfate 2 gram / 50mL Infusion (GIVE X 3 BAGS TO EQUAL 6GM TOTAL DOSE) - Mg 1.1 - 1.5 mg/dl  2 g Intravenous PRN Case, Marquita V., DO        Or   • Magnesium Sulfate 4 gram infusion- Mg 1.6-1.9 mg/dL  4 g Intravenous PRN Case, Marquita V., DO 25 mL/hr at 12/14/19 1006 4 g at 12/14/19 1006   • melatonin tablet 5 mg  5 mg Oral Nightly PRN Bobby Joseph APRN       • pantoprazole (PROTONIX) EC tablet 40 mg  40 mg Oral Q AM Bobby Joseph APRN   40 mg at 12/16/19 0634   • potassium chloride (KLOR-CON) packet 40 mEq  40 mEq Oral PRN Case, Marquita V., DO       • potassium chloride (MICRO-K) CR capsule 40 mEq  40 mEq Oral PRN Case, Marquita V., DO   40 mEq at 12/14/19 1415         Pericarditis    Upper respiratory infection; viral versus bacterial    Dental infection    Tobacco abuse    Community acquired  pneumonia of right lower lobe of lung (CMS/HCC)    Pleurisy        Impression      Right lower lobe pneumonia  Probable pericarditis          Plan     Continue supportive care and work-up per intensivist.   Normal EF   No pericardial effusion  Note that he had a small troponin bump on admission.  He had also presented with chest pain and was noted to have spasm in the distal branch of his circumflex marginal at cardiac catheterization.  Although his troponin bump could be from pericarditis in conjunction with his pneumonia I do not think that we can exclude a small non-STEMI either.  For that reason I am going to put him on Plavix 75 mg daily.    Transfer to telemetry    MD Gregor Alvarado PA  12/16/19  10:09 AM

## 2019-12-16 NOTE — PLAN OF CARE
VSS for shift, required 2L NC in AM, however able to maintain SpO2>90% after getting out of bed to chair, where he remained for majority of shift.  No complaints of pain, ordered to telemetry and awaiting bed placement.

## 2019-12-16 NOTE — PROGRESS NOTES
Multidisciplinary Rounds    Time: 20min  Patient Name: Boom Ulrich  Date of Encounter: 12/16/19 9:33 AM  MRN: [id number]  Admission date: 12/13/2019      Reason for visit: MDR. RD to continue to follow per protocol.     Additional information obtained during MDR: 75% PO intake of past 2 documented meals. OK for pt to be transferred to the floor.     Current diet: Diet Regular; Cardiac      Intervention:  Follow treatment plan  Care plan reviewed    Follow up:   Per protocol      Lisa Luis MS RD/LD CNSC  9:33 AM

## 2019-12-16 NOTE — PLAN OF CARE
Problem: Patient Care Overview  Goal: Plan of Care Review  Outcome: Ongoing (interventions implemented as appropriate)  Flowsheets (Taken 12/16/2019 0629)  Progress: improving  Plan of Care Reviewed With: patient  Outcome Summary: VSS; patient has good independent pulmonary toileting; UOP 900mL

## 2019-12-16 NOTE — PROGRESS NOTES
INTENSIVIST   PROGRESS NOTE     Hospital:  LOS: 3 days     Mr. Schneider, 44 y.o. male is followed for a Chief Complaint of: Fever      Subjective   S     Interval History:  No acute events. Afebrile.        The patient's relevant past medical, surgical and social history were reviewed and updated in Epic as appropriate.      ROS:   Constitutional: Negative for fever.   Respiratory: Negative for dyspnea.   Cardiovascular: Positive for chest pain.   Gastrointestinal: Negative for  nausea, vomiting and diarrhea.     Objective   O     Vitals:  Temp  Min: 97.8 °F (36.6 °C)  Max: 99.3 °F (37.4 °C)  BP  Min: 87/64  Max: 116/79  Pulse  Min: 90  Max: 120  Resp  Min: 16  Max: 20  SpO2  Min: 89 %  Max: 96 % Flow (L/min)  Min: 2  Max: 2    Intake/Ouptut 24 hrs (7:00AM - 6:59 AM)  Intake & Output (last 3 days)       12/13 0701 - 12/14 0700 12/14 0701 - 12/15 0700 12/15 0701 - 12/16 0700 12/16 0701 - 12/17 0700    P.O.  610 450 240    I.V. (mL/kg)  659.6 (8.1) 52 (0.6)     IV Piggyback 100 250 350     Total Intake(mL/kg) 100 1519.6 (18.7) 852 (10.5) 240 (3)    Urine (mL/kg/hr) 800 1225 (0.6) 1825 (0.9) 300 (1)    Stool   0     Total Output 800 1225 1825 300    Net -700 +294.6 -973 -60            Stool Unmeasured Occurrence   1 x             Physical Examination  Telemetry:  Normal sinus rhythm.    Constitutional:  No acute distress.  Sitting up in bed.    Cardiovascular: Normal rate, regular and rhythm. Normal heart sounds.  No murmurs, gallop or rub.   Respiratory: No respiratory distress. Normal respiratory effort.  Rhonchi in the right lower lobe.    Abdominal:  Soft. No masses. Non-tender. No distension. No HSM.   Extremities: No digital cyanosis. No clubbing.  No peripheral edema.   Neurological:   Alert and Oriented to person, place, and time.              Results from last 7 days   Lab Units 12/16/19  0417 12/15/19  0400 12/14/19  0358   WBC 10*3/mm3 11.13* 12.53* 14.98*   HEMOGLOBIN g/dL 10.5* 10.8* 11.7*   MCV fL  93.6 94.5 91.9   PLATELETS 10*3/mm3 367 328 286     Results from last 7 days   Lab Units 12/16/19  0417 12/15/19  0359 12/14/19  1921 12/14/19  0358   SODIUM mmol/L 136 133*  --  136   POTASSIUM mmol/L 4.0 4.0 3.9 3.4*   CO2 mmol/L 24.0 20.0*  --  20.0*   CREATININE mg/dL 0.86 0.83  --  0.76   GLUCOSE mg/dL 100* 113*  --  149*   MAGNESIUM mg/dL 2.3 2.4  --  1.9   PHOSPHORUS mg/dL 2.7 2.8  --  2.9     Estimated Creatinine Clearance: 128.5 mL/min (by C-G formula based on SCr of 0.86 mg/dL).              Images:  Imaging Results (Last 24 Hours)     Procedure Component Value Units Date/Time    XR Chest 1 View [[id number]] Collected:  12/16/19 0846     Updated:  12/16/19 1024    Narrative:       EXAMINATION: XR CHEST 1 VW-12/16/2019:      INDICATION: F/U pneumonia.      COMPARISON: 12/15/2019.     FINDINGS: Portable chest reveals cardiac and mediastinal silhouettes  within normal limits. Patchy ill-defined opacification seen in the lower  lung fields bilaterally stable and unchanged. Small right pleural  effusion. The bony structures are unremarkable. The upper lung fields  are clear.           Impression:       Stable chest as above.     D:  12/16/2019  E:  12/16/2019             XR Chest 1 View [[id number]] Collected:  12/15/19 0850     Updated:  12/15/19 1746    Narrative:          EXAMINATION: XR CHEST 1 VW-      INDICATION: Follow up right lower lobe pneumonia      COMPARISON: 12/13/2019.     FINDINGS: There is airspace disease in the right lower lobe which has  progressed slightly since previous examination of 12/13/2019. There is  also a new small airspace process in the left lower lobe. The heart size  is normal and the heart is radiographically compensated.           Impression:       Increasing right basilar airspace disease as well as a new  left basilar airspace process when compared with 12/13/2019.     This report was finalized on 12/15/2019 5:43 PM by Dr. Kashif Gilliland MD.               Results: Reviewed.  I  reviewed the patient's new laboratory and imaging results.  I independently reviewed the patient's new images.    Medications: Reviewed.    Assessment/Plan   A / P     Mr. Ulrich is a 45yo M with a history of recent dental infection on Keflex who presented with chest pain and fever. He was found to have diffuse ST changes on EKG and a LHC was performed which was unremarkable with the exception of some coronary spasm. He is currently admitted with acute pericarditis. CXR was performed and revealed a right lower lobe infiltrate. He has been started on Rocephin and Azithromycin. Respiratory panel is positive for adenovirus. Sputum culture with normal respiratory eric. It is possible that he aspirated bacteria from his dental infection which caused his RLL pneumonia.       Nutrition:   Diet Regular; Cardiac  Advance Directives:   Code Status and Medical Interventions:   Ordered at: 12/13/19 2238     Code Status:    CPR     Medical Interventions (Level of Support Prior to Arrest):    Full       Active Hospital Problems    Diagnosis   • **Pericarditis   • Pleurisy   • Upper respiratory infection; viral versus bacterial   • Dental infection   • Tobacco abuse   • Community acquired pneumonia of right lower lobe of lung (CMS/HCC)       Assessment / Plan:    1. Continue Ibuprofen.    2. Change Rocephin and Azithromycin to Augmentin. Complete a 10 day course.   3. CXR in the AM  4. AM labs  5. Okay to transfer to telemetry. Will ask the Hospitalists to assume care as his problems are more medically related. Pulmonary consults will continue to follow.     Plan of care and goals reviewed during interdisciplinary rounds.  I discussed the patient's findings and my recommendations with patient and family      Marquita Galindo, DO    Intensive Care Medicine and Pulmonary Medicine

## 2019-12-17 ENCOUNTER — APPOINTMENT (OUTPATIENT)
Dept: GENERAL RADIOLOGY | Facility: HOSPITAL | Age: 44
End: 2019-12-17

## 2019-12-17 VITALS
HEIGHT: 70 IN | WEIGHT: 174.4 LBS | OXYGEN SATURATION: 93 % | HEART RATE: 78 BPM | DIASTOLIC BLOOD PRESSURE: 76 MMHG | RESPIRATION RATE: 18 BRPM | SYSTOLIC BLOOD PRESSURE: 118 MMHG | TEMPERATURE: 96.9 F | BODY MASS INDEX: 24.97 KG/M2

## 2019-12-17 PROBLEM — I21.4 NSTEMI (NON-ST ELEVATED MYOCARDIAL INFARCTION) (HCC): Status: ACTIVE | Noted: 2019-12-17

## 2019-12-17 PROBLEM — I20.1 CORONARY VASOSPASM (HCC): Status: ACTIVE | Noted: 2019-12-17

## 2019-12-17 PROBLEM — R77.8 ELEVATED TROPONIN: Status: ACTIVE | Noted: 2019-12-17

## 2019-12-17 LAB
ANION GAP SERPL CALCULATED.3IONS-SCNC: 11 MMOL/L (ref 5–15)
BUN BLD-MCNC: 15 MG/DL (ref 6–20)
BUN/CREAT SERPL: 17.9 (ref 7–25)
CALCIUM SPEC-SCNC: 9.2 MG/DL (ref 8.6–10.5)
CHLORIDE SERPL-SCNC: 105 MMOL/L (ref 98–107)
CO2 SERPL-SCNC: 23 MMOL/L (ref 22–29)
CREAT BLD-MCNC: 0.84 MG/DL (ref 0.76–1.27)
DEPRECATED RDW RBC AUTO: 47 FL (ref 37–54)
ERYTHROCYTE [DISTWIDTH] IN BLOOD BY AUTOMATED COUNT: 13.8 % (ref 12.3–15.4)
GFR SERPL CREATININE-BSD FRML MDRD: 100 ML/MIN/1.73
GLUCOSE BLD-MCNC: 104 MG/DL (ref 65–99)
HCT VFR BLD AUTO: 34.6 % (ref 37.5–51)
HGB BLD-MCNC: 11.6 G/DL (ref 13–17.7)
MAGNESIUM SERPL-MCNC: 2.3 MG/DL (ref 1.6–2.6)
MCH RBC QN AUTO: 30.7 PG (ref 26.6–33)
MCHC RBC AUTO-ENTMCNC: 33.5 G/DL (ref 31.5–35.7)
MCV RBC AUTO: 91.5 FL (ref 79–97)
PHOSPHATE SERPL-MCNC: 3.5 MG/DL (ref 2.5–4.5)
PLATELET # BLD AUTO: 484 10*3/MM3 (ref 140–450)
PMV BLD AUTO: 9.9 FL (ref 6–12)
POTASSIUM BLD-SCNC: 3.8 MMOL/L (ref 3.5–5.2)
RBC # BLD AUTO: 3.78 10*6/MM3 (ref 4.14–5.8)
SODIUM BLD-SCNC: 139 MMOL/L (ref 136–145)
WBC NRBC COR # BLD: 8.98 10*3/MM3 (ref 3.4–10.8)

## 2019-12-17 PROCEDURE — 99239 HOSP IP/OBS DSCHRG MGMT >30: CPT | Performed by: INTERNAL MEDICINE

## 2019-12-17 PROCEDURE — 85027 COMPLETE CBC AUTOMATED: CPT | Performed by: INTERNAL MEDICINE

## 2019-12-17 PROCEDURE — 84100 ASSAY OF PHOSPHORUS: CPT | Performed by: INTERNAL MEDICINE

## 2019-12-17 PROCEDURE — 83735 ASSAY OF MAGNESIUM: CPT | Performed by: INTERNAL MEDICINE

## 2019-12-17 PROCEDURE — 80048 BASIC METABOLIC PNL TOTAL CA: CPT | Performed by: INTERNAL MEDICINE

## 2019-12-17 PROCEDURE — 71045 X-RAY EXAM CHEST 1 VIEW: CPT

## 2019-12-17 PROCEDURE — 99232 SBSQ HOSP IP/OBS MODERATE 35: CPT | Performed by: INTERNAL MEDICINE

## 2019-12-17 RX ORDER — IBUPROFEN 600 MG/1
600 TABLET ORAL
Start: 2019-12-17

## 2019-12-17 RX ORDER — CLOPIDOGREL BISULFATE 75 MG/1
75 TABLET ORAL DAILY
Qty: 30 TABLET | Refills: 0 | Status: SHIPPED | OUTPATIENT
Start: 2019-12-18

## 2019-12-17 RX ORDER — PANTOPRAZOLE SODIUM 40 MG/1
40 TABLET, DELAYED RELEASE ORAL DAILY
Qty: 30 TABLET | Refills: 0 | Status: SHIPPED | OUTPATIENT
Start: 2019-12-17

## 2019-12-17 RX ORDER — AMOXICILLIN AND CLAVULANATE POTASSIUM 875; 125 MG/1; MG/1
1 TABLET, FILM COATED ORAL EVERY 12 HOURS SCHEDULED
Qty: 14 TABLET | Refills: 0 | Status: SHIPPED | OUTPATIENT
Start: 2019-12-17 | End: 2019-12-24

## 2019-12-17 RX ADMIN — IBUPROFEN 600 MG: 600 TABLET, FILM COATED ORAL at 13:09

## 2019-12-17 RX ADMIN — AMOXICILLIN AND CLAVULANATE POTASSIUM 1 TABLET: 875; 125 TABLET, FILM COATED ORAL at 09:17

## 2019-12-17 RX ADMIN — CLOPIDOGREL BISULFATE 75 MG: 75 TABLET ORAL at 09:17

## 2019-12-17 RX ADMIN — PANTOPRAZOLE SODIUM 40 MG: 40 TABLET, DELAYED RELEASE ORAL at 05:10

## 2019-12-17 RX ADMIN — IBUPROFEN 600 MG: 600 TABLET, FILM COATED ORAL at 09:17

## 2019-12-17 NOTE — PROGRESS NOTES
Continued Stay Note  [facility]     Patient Name: Boom Ulrich  MRN: [id number]  Today's Date: 12/17/2019    Admit Date: 12/13/2019    Discharge Plan     Row Name 12/17/19 0956       Plan    Plan  Home    Patient/Family in Agreement with Plan  yes    Plan Comments  Spoke w/ patient at bedside. He denies any needs. Plan remains to return home w/ SO at discharge. CM following.     Final Discharge Disposition Code  01 - home or self-care        Discharge Codes    No documentation.       Expected Discharge Date and Time     Expected Discharge Date Expected Discharge Time    Dec 18, 2019             Helen Sotelo RN

## 2019-12-17 NOTE — DISCHARGE INSTRUCTIONS
Take ibuprofen 600mg 3 x daily w/ meals x 1 week; then 400mg daily 3 x daily w/ meals x 1 week; then 400mg po 2 x dailiy w/ food until follow up w/ pcp

## 2019-12-17 NOTE — PLAN OF CARE
Problem: Patient Care Overview  Goal: Plan of Care Review  Outcome: Ongoing (interventions implemented as appropriate)  Flowsheets (Taken 12/17/2019 6586)  Progress: no change  Plan of Care Reviewed With: patient  Outcome Summary: VSS; NSR; 2L NC. Pt has no pain. Will continue to monitor.

## 2019-12-17 NOTE — PROGRESS NOTES
"PULMONARY PROGRESS NOTE    Patient Care Team:  Ronnie Miller MD as PCP - General (Family Medicine)    Reason for Consult   RLL pneumonia  Pericarditis      Subjective     44-year-old gentleman recently on Keflex for a dental infection presenting with chest pain and fever.  He had ST changes on EKG.  Left heart catheterization revealed no coronary occlusion but some coronary spasm.  He was hospitalized with acute pericarditis.  Echocardiogram revealed no effusion, normal EF.  Chest x-ray revealed a right lower lobe infiltrate and he was placed on Rocephin and azithromycin.  Respiratory panel is positive for adenovirus.  Sputum culture was usual respiratory eric.    Interval History:   Transferred out of the ICU yesterday.  He has a cough productive of scant mucoid secretions.  Chest pain has resolved.  He is tolerating a p.o. diet without nausea or vomiting.    Review of Systems:     ROS negative except for: Productive cough      Objective     Vital Signs  Blood pressure 132/91, pulse 93, temperature 97.9 °F (36.6 °C), temperature source Oral, resp. rate 18, height 177.8 cm (70\"), weight 79.1 kg (174 lb 6.4 oz), SpO2 91 %.    Physical Exam:  GENERAL well-developed middle-aged gentleman in no distress  HEENT normocephalic, atraumatic.  Pupils equal and reactive to light.  Conjunctiva pink.  Oral mucosa moist  NECK: Trachea midline, no palpable thyroid, no palpable adenopathy  CHEST: Inspiratory crackles right lower lobe posteriorly, otherwise clear  HEART: Regular rhythm, S1, S2 auscultated, no rub  ABDOMEN: Flat, bowel sounds present, soft, nontender  EXTREMITIES: No cyanosis or clubbing, no pitting edema, palpable pedal pulses  NEURO: Alert, oriented, face symmetric, no focal weakness     Results Review:    Lab Results (last 24 hours)     Procedure Component Value Units Date/Time    Phosphorus [[id number]]  (Normal) Collected:  12/17/19 0732    Specimen:  Blood Updated:  12/17/19 0853     Phosphorus 3.5 mg/dL     " Basic Metabolic Panel [[id number]]  (Abnormal) Collected:  12/17/19 0732    Specimen:  Blood Updated:  12/17/19 0847     Glucose 104 mg/dL      BUN 15 mg/dL      Creatinine 0.84 mg/dL      Sodium 139 mmol/L      Potassium 3.8 mmol/L      Chloride 105 mmol/L      CO2 23.0 mmol/L      Calcium 9.2 mg/dL      eGFR Non African Amer 100 mL/min/1.73      BUN/Creatinine Ratio 17.9     Anion Gap 11.0 mmol/L     Narrative:       GFR Normal >60  Chronic Kidney Disease <60  Kidney Failure <15      Magnesium [[id number]]  (Normal) Collected:  12/17/19 0732    Specimen:  Blood Updated:  12/17/19 0847     Magnesium 2.3 mg/dL     CBC (No Diff) [[id number]]  (Abnormal) Collected:  12/17/19 0732    Specimen:  Blood Updated:  12/17/19 0818     WBC 8.98 10*3/mm3      RBC 3.78 10*6/mm3      Hemoglobin 11.6 g/dL      Hematocrit 34.6 %      MCV 91.5 fL      MCH 30.7 pg      MCHC 33.5 g/dL      RDW 13.8 %      RDW-SD 47.0 fl      MPV 9.9 fL      Platelets 484 10*3/mm3     Blood Culture - Blood, Arm, Right [[id number]] Collected:  12/14/19 0358    Specimen:  Blood from Arm, Right Updated:  12/17/19 0630     Blood Culture No growth at 3 days    Blood Culture - Blood, Arm, Left [[id number]] Collected:  12/14/19 0405    Specimen:  Blood from Arm, Left Updated:  12/17/19 0630     Blood Culture No growth at 3 days        Imaging Results (Last 24 Hours)     Procedure Component Value Units Date/Time    XR Chest 1 View [[id number]] Collected:  12/17/19 0833     Updated:  12/17/19 0949    Narrative:       EXAMINATION: XR CHEST 1 VW-12/17/2019:      INDICATION: F/u pneumonia.      COMPARISON: 12/16/2019.     FINDINGS: There is right basilar airspace disease, improved when  compared to the previous examination. The cardiac silhouette is normal  and compensated. There are no new abnormalities.           Impression:       Improving right lower lobe airspace process when compared  with 12/16/2019.     D:  12/17/2019  E:  12/17/2019             XR Chest 1 View  [[id number]] Collected:  12/16/19 0846     Updated:  12/16/19 1431    Narrative:       EXAMINATION: XR CHEST 1 VW-12/16/2019:      INDICATION: F/U pneumonia.      COMPARISON: 12/15/2019.     FINDINGS: Portable chest reveals cardiac and mediastinal silhouettes  within normal limits. Patchy ill-defined opacification seen in the lower  lung fields bilaterally stable and unchanged. Small right pleural  effusion. The bony structures are unremarkable. The upper lung fields  are clear.           Impression:       Stable chest as above.     D:  12/16/2019  E:  12/16/2019     This report was finalized on 12/16/2019 2:28 PM by Dr. Marisela Donaldson MD.                amoxicillin-clavulanate 1 tablet Oral Q12H   clopidogrel 75 mg Oral Daily   ibuprofen 600 mg Oral Daily With Breakfast, Lunch & Dinner   pantoprazole 40 mg Oral Q AM         Pericarditis    Upper respiratory infection; viral versus bacterial    Dental infection    Tobacco abuse    Community acquired pneumonia of right lower lobe of lung (CMS/HCC)    Pleurisy      Assessment/Plan     #1 community-acquired pneumonia right lower lobe, clinically improved on chest x-ray.  Afebrile for 24 hours.  Cultures with usual eric.    #2 acute pericarditis, possibly viral from his adenovirus.  No pericardial effusion on echocardiogram.  Pain resolved with ibuprofen    Discussed with Dr. Marcell San concnik with transitioning to Augmentin and completing a total of 10 days antibiotics.  He will need follow-up with his primary care and chest x-ray to assure resolution of his infiltrate    Gavi Goldman MD  12/17/19  11:09 AM      Time: 20min

## 2019-12-17 NOTE — PROGRESS NOTES
[facility] Progress Note      LOS: 4 days   Patient Care Team:  Ronnie Miller MD as PCP - General (Family Medicine)    Chief Complaint:  No chief complaint on file.      Subjective     Interval History:     Patient Complaints:  Cp, sob, palpitations.        Review of Systems:   A 14 point review of systems was negative except as was stated in the HPI      Objective     Vital Sign Min/Max for last 24 hours  Temp  Min: 96.8 °F (36 °C)  Max: 97.9 °F (36.6 °C)   BP  Min: 103/77  Max: 132/91   Pulse  Min: 73  Max: 98   Resp  Min: 14  Max: 18   SpO2  Min: 90 %  Max: 95 %   Flow (L/min)  Min: 2  Max: 2   Weight  Min: 79.1 kg (174 lb 6.4 oz)  Max: 79.1 kg (174 lb 6.4 oz)         Physical Exam:  General Appearance: Alert, appears stated age and cooperative  Lungs: Clear to auscultation  Heart:: RRR   No Murmurs, Rubs or Gallops  Abdomen: Soft and nontender with adequate bowel sounds.  No organomegaly  Extremities: No cyanosis, clubbing or edema  Pulses: Pulses palpable and equal bilaterally  Skin: Warm and dry with no rash  Psych: Normal     Results Review:     I reviewed the patient's new clinical results.  Results from last 7 days   Lab Units 12/17/19  0732 12/16/19  0417 12/15/19  0359   SODIUM mmol/L 139 136 133*   POTASSIUM mmol/L 3.8 4.0 4.0   CHLORIDE mmol/L 105 100 99   CO2 mmol/L 23.0 24.0 20.0*   BUN mg/dL 15 13 14   CREATININE mg/dL 0.84 0.86 0.83   GLUCOSE mg/dL 104* 100* 113*   CALCIUM mg/dL 9.2 8.8 8.6     Results from last 7 days   Lab Units 12/17/19  0732 12/16/19  0417 12/15/19  0400   WBC 10*3/mm3 8.98 11.13* 12.53*   HEMOGLOBIN g/dL 11.6* 10.5* 10.8*   HEMATOCRIT % 34.6* 32.3* 32.9*   PLATELETS 10*3/mm3 484* 367 328     Lab Results   Lab Value Date/Time    TROPONINT 1.060 (C) 12/14/2019 0358    TROPONINT 0.746 (C) 12/13/2019 2316     Results from last 7 days   Lab Units 12/14/19  0358   CHOLESTEROL mg/dL 99   TRIGLYCERIDES mg/dL 270*   HDL CHOL mg/dL 12*    LDL CHOL mg/dL 33                   Medication Review: yes  Current Facility-Administered Medications   Medication Dose Route Frequency Provider Last Rate Last Dose   • acetaminophen (TYLENOL) tablet 650 mg  650 mg Oral Q6H PRN Case, Marquita V., DO   650 mg at 12/15/19 0448   • amoxicillin-clavulanate (AUGMENTIN) 875-125 MG per tablet 1 tablet  1 tablet Oral Q12H Case, Marquita V., DO   1 tablet at 12/17/19 0917   • clopidogrel (PLAVIX) tablet 75 mg  75 mg Oral Daily Case, Marquita V., DO   75 mg at 12/17/19 0917   • guaiFENesin-codeine (ROMILAR-AC) syrup 5 mL  5 mL Oral Q4H PRN Case, Marquita V., DO   5 mL at 12/15/19 2200   • ibuprofen (ADVIL,MOTRIN) tablet 600 mg  600 mg Oral Daily With Breakfast, Lunch & Dinner Case, Marquita V., DO   600 mg at 12/17/19 0917   • Magnesium Sulfate 2 gram Bolus, followed by 8 gram infusion (total Mg dose 10 grams)- Mg less than or equal to 1mg/dL  2 g Intravenous PRN Case, Marquita V., DO        Or   • Magnesium Sulfate 2 gram / 50mL Infusion (GIVE X 3 BAGS TO EQUAL 6GM TOTAL DOSE) - Mg 1.1 - 1.5 mg/dl  2 g Intravenous PRN Case, Marquita V., DO        Or   • Magnesium Sulfate 4 gram infusion- Mg 1.6-1.9 mg/dL  4 g Intravenous PRN Case, Marquita V., DO 25 mL/hr at 12/14/19 1006 4 g at 12/14/19 1006   • melatonin tablet 5 mg  5 mg Oral Nightly PRN Case, Marquita V., DO       • pantoprazole (PROTONIX) EC tablet 40 mg  40 mg Oral Q AM Case, Marquita V., DO   40 mg at 12/17/19 0510   • potassium chloride (KLOR-CON) packet 40 mEq  40 mEq Oral PRN Case, Marquita V., DO       • potassium chloride (MICRO-K) CR capsule 40 mEq  40 mEq Oral PRN Case, Marquita V., DO   40 mEq at 12/14/19 1415         Pericarditis    Upper respiratory infection; viral versus bacterial    Dental infection    Tobacco abuse    Community acquired pneumonia of right lower lobe of lung (CMS/HCC)    Pleurisy        Impression      Right lower lobe pneumonia  Probable pericarditis          Plan     Continue supportive care and  work-up per intensivist.   Normal EF   No pericardial effusion  Note that he had a small troponin bump on admission.  He had also presented with chest pain and was noted to have spasm in the distal branch of his circumflex marginal at cardiac catheterization.  Although his troponin bump could be from pericarditis in conjunction with his pneumonia I do not think that we can exclude a small non-STEMI either.  For that reason I am going to put him on Plavix 75 mg daily.    CV stable  F/u 4 weeks  Home anytime ok with us  Will stand by    MD Gregor Alvarado PA  12/17/19  11:11 AM

## 2019-12-17 NOTE — DISCHARGE SUMMARY
"    [facility] Medicine Services  DISCHARGE SUMMARY    Patient Name: Boom Ulrich  : 1975  MRN: [id number]    Date of Admission: 2019  8:11 PM  Date of Discharge:  2019  Primary Care Physician: Ronnie Miller MD    Consults     No orders found from 2019 to 2019.          Hospital Course     Presenting Problem:   STEMI (ST elevation myocardial infarction) (CMS/HCC) [I21.3]  Pericarditis [I31.9]    Active Hospital Problems    Diagnosis  POA   • **Pericarditis [I31.9]  Yes   • Community acquired pneumonia of right lower lobe of lung (CMS/HCC) [J18.1]  Yes     Priority: High   • Elevated troponin [R79.89]  Unknown     Priority: Medium   • Coronary vasospasm (CMS/HCC) [I20.1]  Unknown     Priority: Medium   • Pleurisy [R09.1]  Yes   • Upper respiratory infection; viral versus bacterial [J06.9]  Yes   • Dental infection [K04.7]  Yes   • Tobacco abuse [Z72.0]  Yes      Resolved Hospital Problems   No resolved problems to display.      -------------Final Diagnoses----------  *RLL community acquired pneumonia  *Adenovirus respiratory infection  *Pericarditis (likely due to viral infection)   *mild elevation of troponins    -possibly due to pericarditis but cannot rule out \"small\" non-stemi due to coronary vasospasm   -cardiology recommending plavix 75mg daily x 3-6 months; to follow up with Dr. Khalil as outpatient  *Poor dentition   -follow up dentist as outpatient  ------------------------------------------------    Hospital Course:  Boom Ulrich is a 44 y.o. male w/ hx of dental infection treated w/ keflex who presented with few days of fever and chest pain. Patient was found with diffuse st changes on ekg, so was taken to cath-lab by Dr. Khalil at which time coronaries were unremarkable except distal marginal branch (of circumflex artery) vasospasm, which with responded to nitroglycerin. Was admitted to icu post-procedurally and cxr confirmed RLL infiltrate for " "which was initiated on empiric iv antibiotics and followed by pulmonary/intensivists in ICU. Respiratory pcr panel was positive for adenovirus. Echocardiogram did not reveal any pericardial effusion and was otherwise structurally normal w/ normal EF. Was treated with 3 x daily ibuprofen for presumed pericarditis. Transitioned to augmentin for pneumonia. Currently chest pain free, vitals normal, on room air satting mid 90's. Feels well and very eager for discharge home. Pulmonary recommends discharge on augmentin for another week, outpatient follow up cxr by pcp. Regarding pericarditis, I discussed w/ Dr. Khalil on the day of discharge: he recommends ibuprofen to be weaned over coming 3 weeks as outpatient, will also discharge on ppi therapy. Regarding the elevated troponin, he is unable to rule out \"small\" nstemi so he recommends plavix 75mg daily (likely for 3-6 months but he will follow up as outpatient to decide definitively on duration of plavix.        Discharge Follow Up Recommendations for labs/diagnostics:   -recommend follow up CXR at pcp follow up  -f/u pcp 1 week  -f/u dr. Khalil (cardiology 4 weeks)    Day of Discharge     HPI:   Feels well. No pain. No dyspnea    Review of Systems  No headache    Otherwise ROS is negative except as mentioned in the HPI.    Vital Signs:   Temp:  [96.8 °F (36 °C)-97.9 °F (36.6 °C)] 96.9 °F (36.1 °C)  Heart Rate:  [73-98] 78  Resp:  [14-18] 18  BP: (108-132)/(76-96) 118/76     Physical Exam:  Constitutional:Alert, oriented x 3, nontoxic appearing  Psych:Normal/appropriate affect  HEENT:[place], oroph clear  Neck: neck supple, full range of motion  Neuro: Face symmetric, speech clear, equal , moves all extremities  Cardiac: Rrr; No pretibial pitting edema  Resp: right lower lung field crackles, no wheezes, normal effort  GI: abd soft, nontender  Skin: No extremity rash  Musculoskeletal/extremities: no cyanosis extremities; no significant ankle edema      Pertinent  " and/or Most Recent Results     Results from last 7 days   Lab Units 12/17/19  0732 12/16/19  0417 12/15/19  0400 12/15/19  0359 12/14/19  1921 12/14/19  0358   WBC 10*3/mm3 8.98 11.13* 12.53*  --   --  14.98*   HEMOGLOBIN g/dL 11.6* 10.5* 10.8*  --   --  11.7*   HEMATOCRIT % 34.6* 32.3* 32.9*  --   --  35.0*   PLATELETS 10*3/mm3 484* 367 328  --   --  286   SODIUM mmol/L 139 136  --  133*  --  136   POTASSIUM mmol/L 3.8 4.0  --  4.0 3.9 3.4*   CHLORIDE mmol/L 105 100  --  99  --  100   CO2 mmol/L 23.0 24.0  --  20.0*  --  20.0*   BUN mg/dL 15 13  --  14  --  14   CREATININE mg/dL 0.84 0.86  --  0.83  --  0.76   GLUCOSE mg/dL 104* 100*  --  113*  --  149*   CALCIUM mg/dL 9.2 8.8  --  8.6  --  9.1           Invalid input(s): PROT, LABALBU  Results from last 7 days   Lab Units 12/14/19  0358   CHOLESTEROL mg/dL 99   TRIGLYCERIDES mg/dL 270*   HDL CHOL mg/dL 12*     Results from last 7 days   Lab Units 12/14/19  0358 12/13/19  2316   HEMOGLOBIN A1C % 5.70*  --    TROPONIN T ng/mL 1.060* 0.746*       Brief Urine Lab Results     None          Microbiology Results Abnormal     Procedure Component Value - Date/Time    Blood Culture - Blood, Arm, Right [[id number]] Collected:  12/14/19 0358    Lab Status:  Preliminary result Specimen:  Blood from Arm, Right Updated:  12/17/19 0630     Blood Culture No growth at 3 days    Blood Culture - Blood, Arm, Left [[id number]] Collected:  12/14/19 0405    Lab Status:  Preliminary result Specimen:  Blood from Arm, Left Updated:  12/17/19 0630     Blood Culture No growth at 3 days    Respiratory Culture - Sputum, Cough [[id number]] Collected:  12/14/19 0120    Lab Status:  Final result Specimen:  Sputum from Cough Updated:  12/16/19 1022     Respiratory Culture Scant growth (1+) Normal Respiratory Yolis     Gram Stain Few (2+) Epithelial cells per low power field      Occasional WBCs per low power field      Few (2+) Gram positive cocci in pairs      Occasional Budding yeast    S. Pneumo Ag  Urine or CSF - Urine, Urine, Clean Catch [[id number]]  (Normal) Collected:  12/14/19 0045    Lab Status:  Final result Specimen:  Urine, Clean Catch Updated:  12/14/19 1314     Strep Pneumo Ag Negative    Respiratory Panel, PCR - Swab, Nasopharynx [[id number]]  (Abnormal) Collected:  12/14/19 0713    Lab Status:  Final result Specimen:  Swab from Nasopharynx Updated:  12/14/19 0916     ADENOVIRUS, PCR Detected     Coronavirus 229E Not Detected     Coronavirus HKU1 Not Detected     Coronavirus NL63 Not Detected     Coronavirus OC43 Not Detected     Human Metapneumovirus Not Detected     Human Rhinovirus/Enterovirus Not Detected     Influenza B PCR Not Detected     Parainfluenza Virus 1 Not Detected     Parainfluenza Virus 2 Not Detected     Parainfluenza Virus 3 Not Detected     Parainfluenza Virus 4 Not Detected     Bordetella pertussis pcr Not Detected     Influenza A H1 2009 PCR Not Detected     Chlamydophila pneumoniae PCR Not Detected     Mycoplasma pneumo by PCR Not Detected     Influenza A PCR Not Detected     Influenza A H3 Not Detected     Influenza A H1 Not Detected     RSV, PCR Not Detected     Bordetella parapertussis PCR Not Detected          Imaging Results (All)     Procedure Component Value Units Date/Time    XR Chest 1 View [[id number]] Collected:  12/17/19 0833     Updated:  12/17/19 0949    Narrative:       EXAMINATION: XR CHEST 1 VW-12/17/2019:      INDICATION: F/u pneumonia.      COMPARISON: 12/16/2019.     FINDINGS: There is right basilar airspace disease, improved when  compared to the previous examination. The cardiac silhouette is normal  and compensated. There are no new abnormalities.           Impression:       Improving right lower lobe airspace process when compared  with 12/16/2019.     D:  12/17/2019  E:  12/17/2019             XR Chest 1 View [[id number]] Collected:  12/16/19 0846     Updated:  12/16/19 1431    Narrative:       EXAMINATION: XR CHEST 1 VW-12/16/2019:      INDICATION: F/U  pneumonia.      COMPARISON: 12/15/2019.     FINDINGS: Portable chest reveals cardiac and mediastinal silhouettes  within normal limits. Patchy ill-defined opacification seen in the lower  lung fields bilaterally stable and unchanged. Small right pleural  effusion. The bony structures are unremarkable. The upper lung fields  are clear.           Impression:       Stable chest as above.     D:  12/16/2019  E:  12/16/2019     This report was finalized on 12/16/2019 2:28 PM by Dr. Marisela Donaldson MD.       XR Chest 1 View [[id number]] Collected:  12/15/19 0850     Updated:  12/15/19 1746    Narrative:          EXAMINATION: XR CHEST 1 VW-      INDICATION: Follow up right lower lobe pneumonia      COMPARISON: 12/13/2019.     FINDINGS: There is airspace disease in the right lower lobe which has  progressed slightly since previous examination of 12/13/2019. There is  also a new small airspace process in the left lower lobe. The heart size  is normal and the heart is radiographically compensated.           Impression:       Increasing right basilar airspace disease as well as a new  left basilar airspace process when compared with 12/13/2019.     This report was finalized on 12/15/2019 5:43 PM by Dr. Kashif Gilliland MD.       XR Chest 1 View [[id number]] Collected:  12/13/19 2150     Updated:  12/13/19 2152    Narrative:       Chest x-ray portable    INDICATION: URI, history of heart catheter today.    TECHNIQUE: Single frontal portable view the chest is reviewed. No comparison.    FINDINGS: There is a infiltrate in the right lower lobe posterolaterally which could reflect aspiration or pneumonia. Follow-up to clearing is recommended. Lungs are otherwise clear. No congestive failure or definite pleural effusion or pneumothorax.      Impression:       1. There is a infiltrate in the right lower lobe posterolaterally most concerning for aspiration or pneumonia. Follow-up to complete clearing is recommended.    Signer Name: Azucena  Leoncio WHITT   Signed: 12/13/2019 9:50 PM   Workstation Name: [facility]    [facility]                    Results for orders placed during the hospital encounter of 12/13/19   Adult Transthoracic Echo Complete W/ Cont if Necessary Per Protocol    Narrative · Normal LVSF  · No pericardial effusion           Order Current Status    Blood Culture - Blood, Arm, Left Preliminary result    Blood Culture - Blood, Arm, Right Preliminary result        Discharge Details        Discharge Medications      New Medications      Instructions Start Date   amoxicillin-clavulanate 875-125 MG per tablet  Commonly known as:  AUGMENTIN   1 tablet, Oral, Every 12 Hours Scheduled      clopidogrel 75 MG tablet  Commonly known as:  PLAVIX   75 mg, Oral, Daily   Start Date:  December 18, 2019     ibuprofen 600 MG tablet  Commonly known as:  ADVIL,MOTRIN   600 mg, Oral, Daily With Breakfast, Lunch & Dinner, 600mg 3 x daily w/ food x 1 week; then 400mg 3 x daily; the 400mg 2 x daily      pantoprazole 40 MG EC tablet  Commonly known as:  PROTONIX   40 mg, Oral, Daily             No Known Allergies      Discharge Disposition:  Home or Self Care    Diet:  Hospital:  Diet Order   Procedures   • Diet Regular; Cardiac       Activity:           CODE STATUS:    Code Status and Medical Interventions:   Ordered at: 12/13/19 2238     Code Status:    CPR     Medical Interventions (Level of Support Prior to Arrest):    Full       No future appointments.    Additional Instructions for the Follow-ups that You Need to Schedule     Discharge Follow-up with PCP   As directed       Currently Documented PCP:    Ronnie Miller MD    PCP Phone Number:    [phone]     Follow Up Details:  1 week (follow up admission for pericarditis, pneumonia, elevated troponins)         Discharge Follow-up with Specialty: dr. Khalil (cardiology) 4 weeks   As directed      Specialty:  dr. Khalil (cardiology) 4 weeks               Time Spent on  Discharge:  35 min    Electronically signed by Rene San MD, 12/17/19, 12:06 PM.

## 2019-12-18 ENCOUNTER — TRANSITIONAL CARE MANAGEMENT TELEPHONE ENCOUNTER (OUTPATIENT)
Dept: INTERNAL MEDICINE | Facility: CLINIC | Age: 44
End: 2019-12-18

## 2019-12-18 ENCOUNTER — READMISSION MANAGEMENT (OUTPATIENT)
Dept: CALL CENTER | Facility: HOSPITAL | Age: 44
End: 2019-12-18

## 2019-12-18 NOTE — OUTREACH NOTE
Prep Survey      Responses   Facility patient discharged from?  [facility]   Is patient eligible?  Yes   Discharge diagnosis  •**Pericarditis, *RLL community acquired pneumonia   Does the patient have one of the following disease processes/diagnoses(primary or secondary)?  COPD/Pneumonia   Does the patient have Home health ordered?  No   Is there a DME ordered?  No   Medication alerts for this patient  Plavix    Prep survey completed?  Yes          Georgia Rose RN

## 2019-12-18 NOTE — OUTREACH NOTE
"TCM call completed.  See TCM flowsheet for details.  Does have upcoming hospital follow up appt with Dr.. Bro Miller 12/27/19.  States \"doing better.\"  Is up and about and denies any chest pain.  Does still have some SOB, however says improving each day.  Eating and drinking and no issues with bowels or bladder.  Reviewed all medicines and he was unable to get pantoprazole.  I called pharmacy and it was not on formulary, so they have sent PA to the hospitalist. He will wait to see if PA is approved, otherwise the pharmacy states #30 $12.99 cash.    Denies fever, chills,  lightheadedness/dizziness, rapid heart rate/palpitations, n/v, or chest pain.  Denies any needs at present and appreciated the call.  "

## 2019-12-19 ENCOUNTER — READMISSION MANAGEMENT (OUTPATIENT)
Dept: CALL CENTER | Facility: HOSPITAL | Age: 44
End: 2019-12-19

## 2019-12-19 LAB
BACTERIA SPEC AEROBE CULT: NORMAL
BACTERIA SPEC AEROBE CULT: NORMAL

## 2019-12-19 NOTE — OUTREACH NOTE
COPD/PN Week 1 Survey      Responses   Facility patient discharged from?  [facility]   Does the patient have one of the following disease processes/diagnoses(primary or secondary)?  COPD/Pneumonia   Is there a successful TCM telephone encounter documented?  Yes          Edil Mckinney RN

## 2019-12-27 ENCOUNTER — READMISSION MANAGEMENT (OUTPATIENT)
Dept: CALL CENTER | Facility: HOSPITAL | Age: 44
End: 2019-12-27

## 2019-12-27 ENCOUNTER — OFFICE VISIT (OUTPATIENT)
Dept: FAMILY MEDICINE CLINIC | Facility: CLINIC | Age: 44
End: 2019-12-27

## 2019-12-27 VITALS
WEIGHT: 177 LBS | HEIGHT: 70 IN | DIASTOLIC BLOOD PRESSURE: 70 MMHG | RESPIRATION RATE: 18 BRPM | TEMPERATURE: 97.1 F | SYSTOLIC BLOOD PRESSURE: 128 MMHG | HEART RATE: 88 BPM | BODY MASS INDEX: 25.34 KG/M2

## 2019-12-27 DIAGNOSIS — I30.9 ACUTE PERICARDITIS, UNSPECIFIED TYPE: ICD-10-CM

## 2019-12-27 DIAGNOSIS — J18.9 PNEUMONIA OF RIGHT LOWER LOBE DUE TO INFECTIOUS ORGANISM: ICD-10-CM

## 2019-12-27 DIAGNOSIS — Z09 HOSPITAL DISCHARGE FOLLOW-UP: Primary | ICD-10-CM

## 2019-12-27 PROCEDURE — 99495 TRANSJ CARE MGMT MOD F2F 14D: CPT | Performed by: FAMILY MEDICINE

## 2019-12-27 NOTE — OUTREACH NOTE
COPD/PN Week 2 Survey      Responses   Facility patient discharged from?  [facility]   Does the patient have one of the following disease processes/diagnoses(primary or secondary)?  COPD/Pneumonia   Was the primary reason for admission:  Pneumonia   Week 2 attempt successful?  Yes   Call start time  1635   Call end time  1636   Discharge diagnosis  •**Pericarditis, *RLL community acquired pneumonia   Meds reviewed with patient/caregiver?  Yes   Is the patient having any side effects they believe may be caused by any medication additions or changes?  No   Does the patient have all medications ordered at discharge?  Yes   Is the patient taking all medications as directed (includes completed medication regime)?  Yes   Comments regarding appointments  finished antibiotics   Does the patient have a primary care provider?   Yes   Does the patient have an appointment with their PCP or pulmonologist within 7 days of discharge?  Yes   Comments regarding PCP  Dr Miller/ PCP   Has the patient kept scheduled appointments due by today?  Yes   Has home health visited the patient within 72 hours of discharge?  N/A   Psychosocial issues?  No   Did the patient receive a copy of their discharge instructions?  Yes   Nursing interventions  Reviewed instructions with patient   What is the patient's perception of their health status since discharge?  Improving   Nursing Interventions  Nurse provided patient education   Are the patient's immunizations up to date?   Yes   Is the patient/caregiver able to teach back the hierarchy of who to call/visit for symptoms/problems? PCP, Specialist, Home health nurse, Urgent Care, ED, 911  Yes   Is the patient/caregiver able to teach back signs and symptoms of worsening condition:  Fever/chills, Shortness of breath, Chest pain   Is the patient/caregiver able to teach back importance of completing antibiotic course of treatment?  Yes   Week 2 call completed?  Yes          Edil Mckinney RN

## 2019-12-27 NOTE — PROGRESS NOTES
Transitional Care Follow Up Visit  Subjective     Boom Ulrich is a 44 y.o. male who presents for a transitional care management visit.    Within 48 business hours after discharge our office contacted him via telephone to coordinate his care and needs.      I reviewed and discussed the details of that call along with the discharge summary, hospital problems, inpatient lab results, inpatient diagnostic studies, and consultation reports with Boom.     Current outpatient and discharge medications have been reconciled for the patient.  Reviewed by: Ronnie Miller MD      Date of TCM Phone Call 12/18/2019   [facility]   Date of Admission 12/13/2019   Date of Discharge 12/17/2019   Discharge Disposition Home or Self Care     Risk for Readmission (LACE) Score: 8 (12/17/2019  6:00 AM)      History of Present Illness     Had a ? Tooth issue, filling fell out or chipped. Then had a fever x 3 days and [facility] and [facility] flu test and strep test. Then saw Dr Redman here. Test were all neg. Gave steroid and cough med and antibiotic.     Then to ER, felt worse. + chest pressure and pain   Was told was having MI and had cath, dx with pericarditis , no heart attack. Had spasm while in cath lab.   On plavix and protonix    Now: no chest pain   Little cough, quit tobacco  Sl shortness of breath when talking for awhile    Eating ok  Sleeping ok    Overall he is doing much better.      Course During Hospital Stay:           Hospital Course:  Boom Ulrich is a 44 y.o. male w/ hx of dental infection treated w/ keflex who presented with few days of fever and chest pain. Patient was found with diffuse st changes on ekg, so was taken to cath-lab by Dr. Khalil at which time coronaries were unremarkable except distal marginal branch (of circumflex artery) vasospasm, which with responded to nitroglycerin. Was admitted to icu post-procedurally and cxr confirmed RLL infiltrate for which was initiated on empiric  "iv antibiotics and followed by pulmonary/intensivists in ICU. Respiratory pcr panel was positive for adenovirus. Echocardiogram did not reveal any pericardial effusion and was otherwise structurally normal w/ normal EF. Was treated with 3 x daily ibuprofen for presumed pericarditis. Transitioned to augmentin for pneumonia. Currently chest pain free, vitals normal, on room air satting mid 90's. Feels well and very eager for discharge home. Pulmonary recommends discharge on augmentin for another week, outpatient follow up cxr by pcp. Regarding pericarditis, I discussed w/ Dr. Khalil on the day of discharge: he recommends ibuprofen to be weaned over coming 3 weeks as outpatient, will also discharge on ppi therapy. Regarding the elevated troponin, he is unable to rule out \"small\" nstemi so he recommends plavix 75mg daily (likely for 3-6 months but he will follow up as outpatient to decide definitively on duration of plavix.          Discharge Follow Up Recommendations for labs/diagnostics:   -recommend follow up CXR at pcp follow up  -f/u pcp 1 week  -f/u dr. Khalil (cardiology 4 weeks)     The following portions of the patient's history were reviewed and updated as appropriate: allergies, current medications, past medical history, past social history, past surgical history and problem list.    Review of Systems   Constitutional: Negative.    HENT: Negative.    Eyes: Negative.    Respiratory: Positive for cough ( Improved) and shortness of breath ( Occasional).    Cardiovascular: Negative.  Negative for chest pain, palpitations and leg swelling.   Gastrointestinal: Negative.    Endocrine: Negative.    Genitourinary: Negative.    Musculoskeletal: Negative.    Neurological: Negative.    Psychiatric/Behavioral: Negative.        Objective   Physical Exam   Constitutional: Vital signs are normal. He appears well-developed and well-nourished.   HENT:   Head: Normocephalic and atraumatic.   Right Ear: Hearing, tympanic " membrane, external ear and ear canal normal.   Left Ear: Hearing, tympanic membrane, external ear and ear canal normal.   Mouth/Throat: Oropharynx is clear and moist.   Eyes: Pupils are equal, round, and reactive to light. Conjunctivae, EOM and lids are normal.   Neck: Normal range of motion. Neck supple. No thyromegaly present.   Cardiovascular: Normal rate, regular rhythm and normal heart sounds. Exam reveals no friction rub.   No murmur heard.  Pulmonary/Chest: Effort normal and breath sounds normal. No respiratory distress. He has no wheezes. He has no rales.   Abdominal: Soft. Normal appearance and bowel sounds are normal. He exhibits no distension and no mass. There is no tenderness. There is no rebound and no guarding.   Musculoskeletal: He exhibits no edema.   Neurological: He is alert. He has normal strength.   Skin: Skin is warm and dry.   Psychiatric: He has a normal mood and affect. His speech is normal. Cognition and memory are normal.   Nursing note and vitals reviewed.      Assessment/Plan   Problems Addressed this Visit        Cardiovascular and Mediastinum    Pericarditis    Relevant Orders    Ambulatory Referral to Cardiology      Other Visit Diagnoses     Hospital discharge follow-up    -  Primary    Relevant Orders    Ambulatory Referral to Cardiology    Pneumonia of right lower lobe due to infectious organism (CMS/HCC)        Relevant Orders    XR Chest PA & Lateral        Overall much improved.  Recommend recheck chest x-ray in 2 weeks time.  Follow-up with cardiology.  Stay on Plavix until they release.  Needs to have some dental work done and we will need to arrange that through cardiology.  He will discuss with them.      Further plan once x-ray reviewed.       Ronnie Miller MD

## 2020-01-09 ENCOUNTER — HOSPITAL ENCOUNTER (OUTPATIENT)
Dept: GENERAL RADIOLOGY | Facility: HOSPITAL | Age: 45
Discharge: HOME | End: 2020-01-09
Admitting: FAMILY MEDICINE

## 2020-01-09 DIAGNOSIS — J18.9 PNEUMONIA OF RIGHT LOWER LOBE DUE TO INFECTIOUS ORGANISM: ICD-10-CM

## 2020-01-09 PROCEDURE — 71046 X-RAY EXAM CHEST 2 VIEWS: CPT

## 2020-02-12 ENCOUNTER — TELEPHONE (OUTPATIENT)
Dept: FAMILY MEDICINE CLINIC | Facility: CLINIC | Age: 45
End: 2020-02-12

## 2020-02-12 NOTE — TELEPHONE ENCOUNTER
Please call.  I will take a look at these but he may need an appointment to fill these out depending on what they are asking for.

## 2020-02-13 NOTE — TELEPHONE ENCOUNTER
I thought it was for something else. I can fill it out but once I read over it more depth, I may have some questions on dates he missed work. What days did he miss and is he back to work?

## 2020-02-13 NOTE — TELEPHONE ENCOUNTER
Called patient he stated its a $40 for office visit. Was wanting to make sure he needed to come in first.

## 2020-02-13 NOTE — TELEPHONE ENCOUNTER
Symptoms started on 12-6-19, first date missed work was 12-9-19 and did not return back to work until 12-24-19. Pt is currently working.

## 2020-02-14 NOTE — TELEPHONE ENCOUNTER
Called informed pt ready for . He said for it to be faxed to [phone]. Faxed and placed up front.

## 2020-03-18 ENCOUNTER — TELEPHONE (OUTPATIENT)
Dept: FAMILY MEDICINE CLINIC | Facility: CLINIC | Age: 45
End: 2020-03-18

## 2020-03-18 NOTE — TELEPHONE ENCOUNTER
Please call, options can refer to ENT for eval or can make an appt to reassess. Looks like it has been awhile since this has been seen.

## 2020-03-18 NOTE — TELEPHONE ENCOUNTER
Pt called because he was told that there was fluid on his left ear and would like to know what to do because the fluid is still there. Pt stated that he was prescribed a medication that he could not  because he was in the hospital.     Please call and adv   [phone]

## 2020-08-18 ENCOUNTER — DOCUMENTATION (OUTPATIENT)
Dept: CARDIOLOGY | Facility: HOSPITAL | Age: 45
End: 2020-08-18

## 2020-08-18 NOTE — PROGRESS NOTES
For admission 12/13/2019: Patient not discharged on aspirin due to it not being indicated and no beta-blocker due to hypotension.

## (undated) DEVICE — KT MANIFOLD CATHLAB CUST

## (undated) DEVICE — GW J TP FIX CORE .035 150

## (undated) DEVICE — PINNACLE INTRODUCER SHEATH: Brand: PINNACLE

## (undated) DEVICE — PK CATH CARD 10

## (undated) DEVICE — DEV INFL MONARCH 25W

## (undated) DEVICE — ANGIO-SEAL VIP VASCULAR CLOSURE DEVICE: Brand: ANGIO-SEAL

## (undated) DEVICE — CATH DIAG EXPO M/ PK 6FR FL4/FR4 PIG 3PK